# Patient Record
Sex: MALE | Race: BLACK OR AFRICAN AMERICAN | NOT HISPANIC OR LATINO | URBAN - METROPOLITAN AREA
[De-identification: names, ages, dates, MRNs, and addresses within clinical notes are randomized per-mention and may not be internally consistent; named-entity substitution may affect disease eponyms.]

---

## 2020-09-08 ENCOUNTER — OFFICE VISIT (OUTPATIENT)
Dept: URBAN - METROPOLITAN AREA TELEHEALTH 2 | Facility: TELEHEALTH | Age: 75
End: 2020-09-08
Payer: MEDICARE

## 2020-09-08 DIAGNOSIS — K74.69 OTHER CIRRHOSIS OF LIVER: ICD-10-CM

## 2020-09-08 PROCEDURE — 99442 PHONE E/M BY PHYS 11-20 MIN: CPT | Performed by: INTERNAL MEDICINE

## 2020-09-08 RX ORDER — CARVEDILOL 25 MG/1
TAKE 1 TABLET (25 MG) BY ORAL ROUTE 2 TIMES PER DAY WITH FOOD TABLET, FILM COATED ORAL 2
Qty: 0 | Refills: 0 | Status: ACTIVE | COMMUNITY
Start: 1900-01-01

## 2020-09-08 RX ORDER — LEVOTHYROXINE SODIUM 0.05 MG/1
TAKE 1 TABLET (50 MCG) BY ORAL ROUTE ONCE DAILY TABLET ORAL 1
Qty: 0 | Refills: 0 | Status: ACTIVE | COMMUNITY
Start: 1900-01-01

## 2020-09-08 RX ORDER — TORSEMIDE 20 MG/1
TAKE 1 TABLET (20 MG) BY ORAL ROUTE ONCE DAILY TABLET ORAL 1
Qty: 0 | Refills: 0 | Status: ACTIVE | COMMUNITY
Start: 1900-01-01

## 2020-09-08 RX ORDER — CALCITRIOL 0.25 UG/1
TAKE 1 CAPSULE (0.25 MCG) BY ORAL ROUTE ONCE DAILY CAPSULE ORAL 1
Qty: 0 | Refills: 0 | Status: ACTIVE | COMMUNITY
Start: 1900-01-01

## 2020-09-08 RX ORDER — APIXABAN 2.5 MG/1
TAKE 1 TABLET (2.5 MG) BY ORAL ROUTE 2 TIMES PER DAY TABLET, FILM COATED ORAL 2
Qty: 0 | Refills: 0 | Status: ACTIVE | COMMUNITY
Start: 1900-01-01

## 2020-09-08 RX ORDER — HYDRALAZINE HYDROCHLORIDE 50 MG/1
TAKE 1 TABLET (50 MG) BY ORAL ROUTE 2 TIMES PER DAY WITH FOOD TABLET ORAL 2
Qty: 0 | Refills: 0 | Status: ACTIVE | COMMUNITY
Start: 1900-01-01

## 2020-09-08 NOTE — HPI-TODAY'S VISIT:
The patient is following up for cirrhosis.  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  Currently taking diuretics daily that's being managed by cardiology.   Doing well now with fluids.  Scheduled for what seems like a RHC by cardiologist.

## 2020-09-18 ENCOUNTER — TELEPHONE ENCOUNTER (OUTPATIENT)
Dept: URBAN - METROPOLITAN AREA CLINIC 115 | Facility: CLINIC | Age: 75
End: 2020-09-18

## 2021-02-26 ENCOUNTER — OFFICE VISIT (OUTPATIENT)
Dept: URBAN - METROPOLITAN AREA CLINIC 42 | Facility: CLINIC | Age: 76
End: 2021-02-26
Payer: MEDICARE

## 2021-02-26 DIAGNOSIS — K74.69 OTHER CIRRHOSIS OF LIVER: ICD-10-CM

## 2021-02-26 PROCEDURE — 99213 OFFICE O/P EST LOW 20 MIN: CPT | Performed by: INTERNAL MEDICINE

## 2021-02-26 RX ORDER — APIXABAN 2.5 MG/1
TAKE 1 TABLET (2.5 MG) BY ORAL ROUTE 2 TIMES PER DAY TABLET, FILM COATED ORAL 2
Qty: 0 | Refills: 0 | Status: ACTIVE | COMMUNITY
Start: 1900-01-01

## 2021-02-26 RX ORDER — CARVEDILOL 25 MG/1
TAKE 1 TABLET (25 MG) BY ORAL ROUTE 2 TIMES PER DAY WITH FOOD TABLET, FILM COATED ORAL 2
Qty: 0 | Refills: 0 | Status: ACTIVE | COMMUNITY
Start: 1900-01-01

## 2021-02-26 RX ORDER — HYDRALAZINE HYDROCHLORIDE 50 MG/1
TAKE 1 TABLET (50 MG) BY ORAL ROUTE 2 TIMES PER DAY WITH FOOD TABLET ORAL 2
Qty: 0 | Refills: 0 | Status: ACTIVE | COMMUNITY
Start: 1900-01-01

## 2021-02-26 RX ORDER — CALCITRIOL 0.25 UG/1
TAKE 1 CAPSULE (0.25 MCG) BY ORAL ROUTE ONCE DAILY CAPSULE ORAL 1
Qty: 0 | Refills: 0 | Status: ACTIVE | COMMUNITY
Start: 1900-01-01

## 2021-02-26 RX ORDER — LEVOTHYROXINE SODIUM 0.05 MG/1
TAKE 1 TABLET (50 MCG) BY ORAL ROUTE ONCE DAILY TABLET ORAL 1
Qty: 0 | Refills: 0 | Status: ACTIVE | COMMUNITY
Start: 1900-01-01

## 2021-02-26 RX ORDER — TORSEMIDE 20 MG/1
TAKE 1 TABLET (20 MG) BY ORAL ROUTE ONCE DAILY TABLET ORAL 1
Qty: 0 | Refills: 0 | Status: ACTIVE | COMMUNITY
Start: 1900-01-01

## 2021-02-26 NOTE — PHYSICAL EXAM EYES:
Conjuntivae and eyelids appear normal, Sclerae : White without injection , Conjuntivae and eyelids appear normal,  Sclerae : White without injection

## 2021-02-26 NOTE — HPI-TODAY'S VISIT:
The patient is following up today for cirrhosis.  Since the last visit, doing well.  No real complaints today.  Seeing cardiology and being managed with furosemide 40 mg qday.  Still drink 1 drink every other week.  No GI bleeding.  Normal BMs.

## 2021-02-26 NOTE — PHYSICAL EXAM HENT:
Head, normocephalic, atraumatic, Face, Face within normal limits, Ears, External ears within normal limits, Nose/Nasopharynx, External nose  normal appearance, nares patent, no nasal discharge, Mouth and Throat, Oral cavity appearance normal, Breath odor normal, Lips, Appearance normal , Head,  normocephalic,  atraumatic,  Face,  Face within normal limits,  Ears,  External ears within normal limits,  Nose/Nasopharynx,  External nose  normal appearance,  nares patent,  no nasal discharge,  Mouth and Throat,  Oral cavity appearance normal,  Breath odor normal,  Lips,  Appearance normal

## 2021-02-26 NOTE — PHYSICAL EXAM GASTROINTESTINAL
Abdomen , soft, but slightly distended , no guarding or rigidity , no masses palpable , normal bowel sounds , Liver and Spleen , no hepatomegaly present , no hepatosplenomegaly , liver nontender , spleen not palpable , Abdomen , soft, nontender, nondistended , no guarding or rigidity , no masses palpable , normal bowel sounds , Liver and Spleen , no hepatomegaly present , no hepatosplenomegaly , liver nontender , spleen not palpable

## 2021-02-26 NOTE — PHYSICAL EXAM CARDIOVASCULAR:
no edema, no murmurs, regular rate and rhythm , no edema,  no murmurs,  regular rate and rhythm , no edema.

## 2021-09-03 ENCOUNTER — OFFICE VISIT (OUTPATIENT)
Dept: URBAN - METROPOLITAN AREA CLINIC 42 | Facility: CLINIC | Age: 76
End: 2021-09-03
Payer: MEDICARE

## 2021-09-03 VITALS
WEIGHT: 197 LBS | TEMPERATURE: 97.3 F | SYSTOLIC BLOOD PRESSURE: 105 MMHG | HEART RATE: 44 BPM | RESPIRATION RATE: 14 BRPM | DIASTOLIC BLOOD PRESSURE: 61 MMHG | HEIGHT: 72 IN | BODY MASS INDEX: 26.68 KG/M2

## 2021-09-03 DIAGNOSIS — K74.69 OTHER CIRRHOSIS OF LIVER: ICD-10-CM

## 2021-09-03 PROCEDURE — 99213 OFFICE O/P EST LOW 20 MIN: CPT | Performed by: INTERNAL MEDICINE

## 2021-09-03 RX ORDER — TORSEMIDE 20 MG/1
TAKE 1 TABLET (20 MG) BY ORAL ROUTE ONCE DAILY TABLET ORAL 1
Qty: 0 | Refills: 0 | Status: ACTIVE | COMMUNITY
Start: 1900-01-01

## 2021-09-03 RX ORDER — CALCITRIOL 0.25 UG/1
TAKE 1 CAPSULE (0.25 MCG) BY ORAL ROUTE ONCE DAILY CAPSULE ORAL 1
Qty: 0 | Refills: 0 | Status: ACTIVE | COMMUNITY
Start: 1900-01-01

## 2021-09-03 RX ORDER — APIXABAN 2.5 MG/1
TAKE 1 TABLET (2.5 MG) BY ORAL ROUTE 2 TIMES PER DAY TABLET, FILM COATED ORAL 2
Qty: 0 | Refills: 0 | Status: ACTIVE | COMMUNITY
Start: 1900-01-01

## 2021-09-03 RX ORDER — CARVEDILOL 25 MG/1
TAKE 1 TABLET (25 MG) BY ORAL ROUTE 2 TIMES PER DAY WITH FOOD TABLET, FILM COATED ORAL 2
Qty: 0 | Refills: 0 | Status: ACTIVE | COMMUNITY
Start: 1900-01-01

## 2021-09-03 RX ORDER — LEVOTHYROXINE SODIUM 0.05 MG/1
TAKE 1 TABLET (50 MCG) BY ORAL ROUTE ONCE DAILY TABLET ORAL 1
Qty: 0 | Refills: 0 | Status: ACTIVE | COMMUNITY
Start: 1900-01-01

## 2021-09-03 RX ORDER — HYDRALAZINE HYDROCHLORIDE 50 MG/1
TAKE 1 TABLET (50 MG) BY ORAL ROUTE 2 TIMES PER DAY WITH FOOD TABLET ORAL 2
Qty: 0 | Refills: 0 | Status: ACTIVE | COMMUNITY
Start: 1900-01-01

## 2021-09-03 NOTE — PHYSICAL EXAM CARDIOVASCULAR:
findings of chronic LE edema with discoloration and thickened skin,  no murmurs,  regular rate and rhythm

## 2021-09-03 NOTE — PHYSICAL EXAM CHEST:
no lesions,  no deformities,  no traumatic injuries,  no significant scars are present,  chest wall non-tender,  no masses present, breathing is unlabored without accessory muscle use,normal breath sounds
Attending Only

## 2021-09-03 NOTE — PHYSICAL EXAM GASTROINTESTINAL
Abdomen , soft, nontender, mildly distended , no guarding or rigidity , no masses palpable , normal bowel sounds , Liver and Spleen , no hepatomegaly present , no hepatosplenomegaly , liver nontender , spleen not palpable

## 2022-02-04 ENCOUNTER — TELEPHONE ENCOUNTER (OUTPATIENT)
Dept: URBAN - METROPOLITAN AREA CLINIC 6 | Facility: CLINIC | Age: 77
End: 2022-02-04

## 2022-08-08 ENCOUNTER — LAB OUTSIDE AN ENCOUNTER (OUTPATIENT)
Dept: URBAN - METROPOLITAN AREA CLINIC 42 | Facility: CLINIC | Age: 77
End: 2022-08-08

## 2022-08-23 ENCOUNTER — TELEPHONE ENCOUNTER (OUTPATIENT)
Dept: URBAN - METROPOLITAN AREA CLINIC 92 | Facility: CLINIC | Age: 77
End: 2022-08-23

## 2022-08-26 ENCOUNTER — WEB ENCOUNTER (OUTPATIENT)
Dept: URBAN - METROPOLITAN AREA CLINIC 42 | Facility: CLINIC | Age: 77
End: 2022-08-26

## 2022-08-26 ENCOUNTER — OFFICE VISIT (OUTPATIENT)
Dept: URBAN - METROPOLITAN AREA CLINIC 42 | Facility: CLINIC | Age: 77
End: 2022-08-26
Payer: MEDICARE

## 2022-08-26 VITALS
DIASTOLIC BLOOD PRESSURE: 78 MMHG | HEIGHT: 72 IN | WEIGHT: 210 LBS | TEMPERATURE: 97.1 F | BODY MASS INDEX: 28.44 KG/M2 | RESPIRATION RATE: 20 BRPM | SYSTOLIC BLOOD PRESSURE: 142 MMHG | HEART RATE: 67 BPM

## 2022-08-26 DIAGNOSIS — K74.69 OTHER CIRRHOSIS OF LIVER: ICD-10-CM

## 2022-08-26 PROCEDURE — 99213 OFFICE O/P EST LOW 20 MIN: CPT | Performed by: INTERNAL MEDICINE

## 2022-08-26 RX ORDER — CARVEDILOL 25 MG/1
TAKE 1 TABLET (25 MG) BY ORAL ROUTE 2 TIMES PER DAY WITH FOOD TABLET, FILM COATED ORAL 2
Qty: 0 | Refills: 0 | Status: ACTIVE | COMMUNITY
Start: 1900-01-01

## 2022-08-26 RX ORDER — TORSEMIDE 20 MG/1
TAKE 1 TABLET (20 MG) BY ORAL ROUTE ONCE DAILY TABLET ORAL 1
Qty: 0 | Refills: 0 | Status: ACTIVE | COMMUNITY
Start: 1900-01-01

## 2022-08-26 RX ORDER — LACTULOSE 10 G/15ML
15 ML AS NEEDED SOLUTION ORAL
Qty: 900 | Refills: 6 | OUTPATIENT
Start: 2022-08-26 | End: 2023-03-24

## 2022-08-26 RX ORDER — APIXABAN 2.5 MG/1
TAKE 1 TABLET (2.5 MG) BY ORAL ROUTE 2 TIMES PER DAY TABLET, FILM COATED ORAL 2
Qty: 0 | Refills: 0 | Status: ACTIVE | COMMUNITY
Start: 1900-01-01

## 2022-08-26 RX ORDER — LEVOTHYROXINE SODIUM 0.05 MG/1
TAKE 1 TABLET (50 MCG) BY ORAL ROUTE ONCE DAILY TABLET ORAL 1
Qty: 0 | Refills: 0 | Status: ACTIVE | COMMUNITY
Start: 1900-01-01

## 2022-08-26 RX ORDER — HYDRALAZINE HYDROCHLORIDE 50 MG/1
TAKE 1 TABLET (50 MG) BY ORAL ROUTE 2 TIMES PER DAY WITH FOOD TABLET ORAL 2
Qty: 0 | Refills: 0 | Status: ACTIVE | COMMUNITY
Start: 1900-01-01

## 2022-08-26 RX ORDER — CALCITRIOL 0.25 UG/1
TAKE 1 CAPSULE (0.25 MCG) BY ORAL ROUTE ONCE DAILY CAPSULE ORAL 1
Qty: 0 | Refills: 0 | Status: ACTIVE | COMMUNITY
Start: 1900-01-01

## 2022-08-26 NOTE — HPI-TODAY'S VISIT:
The patient is following up today for cirrhosis.  Since the last visit, doing well.  No real complaints today.  Seeing cardiology and being managed with diuretics.  Still drink 1 drink every other week.  No GI bleeding.  complaining of constipation and having BM every 2-3 days despite miralax.

## 2023-02-24 ENCOUNTER — TELEPHONE ENCOUNTER (OUTPATIENT)
Dept: URBAN - METROPOLITAN AREA CLINIC 42 | Facility: CLINIC | Age: 78
End: 2023-02-24

## 2023-02-24 ENCOUNTER — OFFICE VISIT (OUTPATIENT)
Dept: URBAN - METROPOLITAN AREA CLINIC 42 | Facility: CLINIC | Age: 78
End: 2023-02-24
Payer: MEDICARE

## 2023-02-24 ENCOUNTER — DASHBOARD ENCOUNTERS (OUTPATIENT)
Age: 78
End: 2023-02-24

## 2023-02-24 VITALS
HEART RATE: 74 BPM | RESPIRATION RATE: 20 BRPM | DIASTOLIC BLOOD PRESSURE: 76 MMHG | HEIGHT: 72 IN | WEIGHT: 192 LBS | TEMPERATURE: 97.1 F | BODY MASS INDEX: 26.01 KG/M2 | SYSTOLIC BLOOD PRESSURE: 123 MMHG

## 2023-02-24 DIAGNOSIS — K74.69 OTHER CIRRHOSIS OF LIVER: ICD-10-CM

## 2023-02-24 DIAGNOSIS — Z86.010 HISTORY OF COLON POLYPS: ICD-10-CM

## 2023-02-24 PROCEDURE — 99214 OFFICE O/P EST MOD 30 MIN: CPT | Performed by: INTERNAL MEDICINE

## 2023-02-24 RX ORDER — LACTULOSE 10 G/15ML
15 ML AS NEEDED SOLUTION ORAL
Qty: 900 | Refills: 6 | Status: ACTIVE | COMMUNITY
Start: 2022-08-26 | End: 2023-03-24

## 2023-02-24 RX ORDER — TORSEMIDE 20 MG/1
TAKE 1 TABLET (20 MG) BY ORAL ROUTE ONCE DAILY TABLET ORAL 1
Qty: 0 | Refills: 0 | Status: ACTIVE | COMMUNITY
Start: 1900-01-01

## 2023-02-24 RX ORDER — CARVEDILOL 25 MG/1
TAKE 1 TABLET (25 MG) BY ORAL ROUTE 2 TIMES PER DAY WITH FOOD TABLET, FILM COATED ORAL 2
Qty: 0 | Refills: 0 | Status: ACTIVE | COMMUNITY
Start: 1900-01-01

## 2023-02-24 RX ORDER — CALCITRIOL 0.25 UG/1
TAKE 1 CAPSULE (0.25 MCG) BY ORAL ROUTE ONCE DAILY CAPSULE ORAL 1
Qty: 0 | Refills: 0 | Status: ACTIVE | COMMUNITY
Start: 1900-01-01

## 2023-02-24 RX ORDER — APIXABAN 2.5 MG/1
TAKE 1 TABLET (2.5 MG) BY ORAL ROUTE 2 TIMES PER DAY TABLET, FILM COATED ORAL 2
Qty: 0 | Refills: 0 | Status: ACTIVE | COMMUNITY
Start: 1900-01-01

## 2023-02-24 RX ORDER — LEVOTHYROXINE SODIUM 0.05 MG/1
TAKE 1 TABLET (50 MCG) BY ORAL ROUTE ONCE DAILY TABLET ORAL 1
Qty: 0 | Refills: 0 | Status: ACTIVE | COMMUNITY
Start: 1900-01-01

## 2023-02-24 RX ORDER — HYDRALAZINE HYDROCHLORIDE 50 MG/1
TAKE 1 TABLET (50 MG) BY ORAL ROUTE 2 TIMES PER DAY WITH FOOD TABLET ORAL 2
Qty: 0 | Refills: 0 | Status: ACTIVE | COMMUNITY
Start: 1900-01-01

## 2023-02-24 NOTE — HPI-TODAY'S VISIT:
The patient is following up today for cirrhosis.  Since the last visit, doing well.  No real complaints today.  Seeing cardiology and being managed with diuretics.  Still drink 1 drink every other week.  No GI bleeding.

## 2023-02-28 PROBLEM — 428283002: Status: ACTIVE | Noted: 2023-02-24

## 2023-02-28 PROBLEM — 19943007: Status: ACTIVE | Noted: 2020-09-08

## 2023-03-13 NOTE — PROGRESS NOTES
Subjective   Patient ID:   Daryl Gramajo is a 77 y.o. male who presents for Edema (Bilateral ankles, +1 pitting edema right ankle.) and Dysphagia (Coughing when eating).  HPI  DVT:  Occurred in Oct 2022.  Right leg.  Taking Xarelto - has not been on this in months.  States he ran out and never got it filled.  Not having any chest pains, SOB, or leg pains.  This was his first DVT.  Xarelto had previously helped with his leg swelling.    RA:  Seeing rheumatology.  Taking folic acid, methotrexate, sulfasalazine.    HTN:  BP was high last visit.  Taking Chlorthalidone.  BP today is 190/75.  Not checking at home.  Still getting some leg swelling, but this has improved some.  Denies dizziness, headaches.    Bradycardia:  HR is very low today in the 30s.  He normally runs in the 80s-90s.  Denies any dizziness, lightheadedness.  EKG today shows a 2nd degree AV block.  No previous EKG for comparison.    Edema:  Getting knee, wrist, foot edema.  Secondary to RA.  This is stable.    Health maintenance:  Smoking: Never a smoker.   PSA (50+): Aug 2022  Labs: May 2022.   Prevnar 13 (65+):  Pneumovax 23 (65+, 1 year after Prev 13):  Influenza:  Zostavax (60+, 1 time, at pharmacy):     Review of Systems  12 point review of systems negative unless stated above in HPI    Vitals:    03/20/23 1441   BP: (!) 208/92   Pulse:        Physical Exam  General: Alert and oriented, well nourished, no acute distress.  Lungs: Clear to auscultation, non-labored respiration.  Heart: Normal rate, regular rhythm, no murmur, gallop or edema.  Neurologic: Awake, alert, and oriented X3, CN II-XII intact.  Psychiatric: Cooperative, appropriate mood and affect.    Assessment/Plan   We discussed your EKG results, heart rate, and blood pressure in detail today.  I have placed a referral to cardiology for further management.   As we discussed, if you experience dizziness, light headedness, headache, shortness of breath, palpitations, or chest pains - I  would like you to call 911 immediately.  Pt and wife voiced understanding.  Since you do not currently have any symptoms, I would like you to follow up with cardiology ASAP.  I have refilled your Xarelto for now.  If symptoms persist or worsen despite current plan of care, please contact your healthcare provider for further evaluation.  Patient instructed to contact the office if there are any questions regarding their care or treatment.   [facility] [phone].  [facility] [phone].    Fu 1 month or sooner.  Diagnoses and all orders for this visit:  Rheumatoid arthritis involving multiple sites, unspecified whether rheumatoid factor present (CMS/HCC)  Chronic deep vein thrombosis (DVT) of distal vein of right lower extremity (CMS/HCC)  -     rivaroxaban (Xarelto) 20 mg tablet; Take 1 tablet (20 mg) by mouth once daily in the evening. Take with meals. Take with food.  Edema, peripheral  Benign hypertension  -     Referral to Cardiology; Future  Bradycardia  -     ECG 12 lead  -     Referral to Cardiology; Future  AV block  -     Referral to Cardiology; Future

## 2023-03-16 PROBLEM — M06.9 RHEUMATOID ARTHRITIS (MULTI): Status: ACTIVE | Noted: 2023-03-16

## 2023-03-16 PROBLEM — R60.0 EDEMA, PERIPHERAL: Status: ACTIVE | Noted: 2023-03-16

## 2023-03-16 PROBLEM — I82.409 DVT (DEEP VENOUS THROMBOSIS) (MULTI): Status: ACTIVE | Noted: 2023-03-16

## 2023-03-16 PROBLEM — I10 BENIGN HYPERTENSION: Status: ACTIVE | Noted: 2023-03-16

## 2023-03-16 PROBLEM — R60.9 EDEMA, PERIPHERAL: Status: ACTIVE | Noted: 2023-03-16

## 2023-03-16 RX ORDER — FLUOROMETHOLONE 1 MG/ML
SUSPENSION/ DROPS OPHTHALMIC
COMMUNITY
Start: 2022-08-09

## 2023-03-16 RX ORDER — BRINZOLAMIDE/BRIMONIDINE TARTRATE 10; 2 MG/ML; MG/ML
SUSPENSION/ DROPS OPHTHALMIC
COMMUNITY
Start: 2022-08-09

## 2023-03-16 RX ORDER — CHLORTHALIDONE 50 MG/1
50 TABLET ORAL DAILY
COMMUNITY
Start: 2022-08-08 | End: 2023-10-23 | Stop reason: SDUPTHER

## 2023-03-16 RX ORDER — ETANERCEPT 50 MG/ML
50 SOLUTION SUBCUTANEOUS
COMMUNITY
End: 2023-12-28

## 2023-03-16 RX ORDER — MULTIVIT-MIN/IRON/FOLIC ACID/K 18-600-40
1 CAPSULE ORAL DAILY
COMMUNITY
Start: 2022-01-05

## 2023-03-16 RX ORDER — ERYTHROMYCIN 5 MG/G
OINTMENT OPHTHALMIC
COMMUNITY
Start: 2022-08-04

## 2023-03-16 RX ORDER — SULFASALAZINE 500 MG/1
500 TABLET ORAL EVERY 12 HOURS
COMMUNITY
Start: 2013-01-03

## 2023-03-16 RX ORDER — METHOTREXATE 2.5 MG/1
6 TABLET ORAL
COMMUNITY
Start: 2012-08-06 | End: 2023-12-12 | Stop reason: SDUPTHER

## 2023-03-16 RX ORDER — FOLIC ACID 1 MG/1
1 TABLET ORAL DAILY
COMMUNITY
Start: 2013-01-03

## 2023-03-20 ENCOUNTER — OFFICE VISIT (OUTPATIENT)
Dept: PRIMARY CARE | Facility: CLINIC | Age: 78
End: 2023-03-20
Payer: MEDICARE

## 2023-03-20 VITALS
SYSTOLIC BLOOD PRESSURE: 208 MMHG | WEIGHT: 170.4 LBS | DIASTOLIC BLOOD PRESSURE: 92 MMHG | HEART RATE: 37 BPM | BODY MASS INDEX: 22.58 KG/M2 | HEIGHT: 73 IN

## 2023-03-20 DIAGNOSIS — R00.1 BRADYCARDIA: ICD-10-CM

## 2023-03-20 DIAGNOSIS — R60.9 EDEMA, PERIPHERAL: ICD-10-CM

## 2023-03-20 DIAGNOSIS — I10 BENIGN HYPERTENSION: ICD-10-CM

## 2023-03-20 DIAGNOSIS — I82.5Z1 CHRONIC DEEP VEIN THROMBOSIS (DVT) OF DISTAL VEIN OF RIGHT LOWER EXTREMITY (MULTI): ICD-10-CM

## 2023-03-20 DIAGNOSIS — M06.9 RHEUMATOID ARTHRITIS INVOLVING MULTIPLE SITES, UNSPECIFIED WHETHER RHEUMATOID FACTOR PRESENT (MULTI): Primary | ICD-10-CM

## 2023-03-20 DIAGNOSIS — I44.30 AV BLOCK: ICD-10-CM

## 2023-03-20 PROCEDURE — 3080F DIAST BP >= 90 MM HG: CPT | Performed by: PHYSICIAN ASSISTANT

## 2023-03-20 PROCEDURE — 3077F SYST BP >= 140 MM HG: CPT | Performed by: PHYSICIAN ASSISTANT

## 2023-03-20 PROCEDURE — 93000 ELECTROCARDIOGRAM COMPLETE: CPT | Performed by: PHYSICIAN ASSISTANT

## 2023-03-20 PROCEDURE — 1159F MED LIST DOCD IN RCRD: CPT | Performed by: PHYSICIAN ASSISTANT

## 2023-03-20 PROCEDURE — 99214 OFFICE O/P EST MOD 30 MIN: CPT | Performed by: PHYSICIAN ASSISTANT

## 2023-03-20 PROCEDURE — 1036F TOBACCO NON-USER: CPT | Performed by: PHYSICIAN ASSISTANT

## 2023-03-20 PROCEDURE — 1160F RVW MEDS BY RX/DR IN RCRD: CPT | Performed by: PHYSICIAN ASSISTANT

## 2023-03-20 RX ORDER — ACETAMINOPHEN 500 MG
5000 TABLET ORAL DAILY
COMMUNITY

## 2023-03-22 ENCOUNTER — OFFICE VISIT (OUTPATIENT)
Dept: URBAN - METROPOLITAN AREA SURGERY CENTER 13 | Facility: SURGERY CENTER | Age: 78
End: 2023-03-22

## 2023-04-14 LAB
ALANINE AMINOTRANSFERASE (SGPT) (U/L) IN SER/PLAS: 8 U/L (ref 10–52)
ALBUMIN (G/DL) IN SER/PLAS: 3.7 G/DL (ref 3.4–5)
ALKALINE PHOSPHATASE (U/L) IN SER/PLAS: 74 U/L (ref 33–136)
ANION GAP IN SER/PLAS: 10 MMOL/L (ref 10–20)
ASPARTATE AMINOTRANSFERASE (SGOT) (U/L) IN SER/PLAS: 17 U/L (ref 9–39)
BILIRUBIN TOTAL (MG/DL) IN SER/PLAS: 0.5 MG/DL (ref 0–1.2)
C REACTIVE PROTEIN (MG/L) IN SER/PLAS: 1.18 MG/DL
CALCIUM (MG/DL) IN SER/PLAS: 8.5 MG/DL (ref 8.6–10.3)
CARBON DIOXIDE, TOTAL (MMOL/L) IN SER/PLAS: 25 MMOL/L (ref 21–32)
CHLORIDE (MMOL/L) IN SER/PLAS: 106 MMOL/L (ref 98–107)
CREATININE (MG/DL) IN SER/PLAS: 0.68 MG/DL (ref 0.5–1.3)
ERYTHROCYTE DISTRIBUTION WIDTH (RATIO) BY AUTOMATED COUNT: 16.3 % (ref 11.5–14.5)
ERYTHROCYTE MEAN CORPUSCULAR HEMOGLOBIN CONCENTRATION (G/DL) BY AUTOMATED: 32.5 G/DL (ref 32–36)
ERYTHROCYTE MEAN CORPUSCULAR VOLUME (FL) BY AUTOMATED COUNT: 89 FL (ref 80–100)
ERYTHROCYTES (10*6/UL) IN BLOOD BY AUTOMATED COUNT: 4.25 X10E12/L (ref 4.5–5.9)
GFR MALE: >90 ML/MIN/1.73M2
GLUCOSE (MG/DL) IN SER/PLAS: 83 MG/DL (ref 74–99)
HEMATOCRIT (%) IN BLOOD BY AUTOMATED COUNT: 37.9 % (ref 41–52)
HEMOGLOBIN (G/DL) IN BLOOD: 12.3 G/DL (ref 13.5–17.5)
LEUKOCYTES (10*3/UL) IN BLOOD BY AUTOMATED COUNT: 4.7 X10E9/L (ref 4.4–11.3)
PLATELETS (10*3/UL) IN BLOOD AUTOMATED COUNT: 204 X10E9/L (ref 150–450)
POTASSIUM (MMOL/L) IN SER/PLAS: 4.1 MMOL/L (ref 3.5–5.3)
PROTEIN TOTAL: 7.3 G/DL (ref 6.4–8.2)
SEDIMENTATION RATE, ERYTHROCYTE: 87 MM/H (ref 0–20)
SODIUM (MMOL/L) IN SER/PLAS: 137 MMOL/L (ref 136–145)
UREA NITROGEN (MG/DL) IN SER/PLAS: 17 MG/DL (ref 6–23)

## 2023-05-10 DIAGNOSIS — I82.5Z1 CHRONIC DEEP VEIN THROMBOSIS (DVT) OF DISTAL VEIN OF RIGHT LOWER EXTREMITY (MULTI): ICD-10-CM

## 2023-06-20 DIAGNOSIS — I82.5Z1 CHRONIC DEEP VEIN THROMBOSIS (DVT) OF DISTAL VEIN OF RIGHT LOWER EXTREMITY (MULTI): ICD-10-CM

## 2023-06-20 RX ORDER — RIVAROXABAN 20 MG/1
TABLET, FILM COATED ORAL
Qty: 60 TABLET | Refills: 5 | Status: SHIPPED | OUTPATIENT
Start: 2023-06-20 | End: 2024-05-30 | Stop reason: WASHOUT

## 2023-07-11 ENCOUNTER — HOSPITAL ENCOUNTER (OUTPATIENT)
Dept: DATA CONVERSION | Facility: HOSPITAL | Age: 78
End: 2023-07-11
Attending: SURGERY | Admitting: SURGERY

## 2023-07-11 DIAGNOSIS — R59.0 LOCALIZED ENLARGED LYMPH NODES: ICD-10-CM

## 2023-07-17 LAB
ATRIAL RATE: 56 BPM
P AXIS: 62 DEGREES
Q ONSET: 220 MS
QRS COUNT: 7 BEATS
QRS DURATION: 96 MS
QT INTERVAL: 488 MS
QTC CALCULATION(BAZETT): 387 MS
QTC FREDERICIA: 418 MS
R AXIS: 27 DEGREES
T AXIS: 36 DEGREES
T OFFSET: 464 MS
VENTRICULAR RATE: 38 BPM

## 2023-08-18 LAB
ALANINE AMINOTRANSFERASE (SGPT) (U/L) IN SER/PLAS: 8 U/L (ref 10–52)
ALBUMIN (G/DL) IN SER/PLAS: 3.8 G/DL (ref 3.4–5)
ALKALINE PHOSPHATASE (U/L) IN SER/PLAS: 65 U/L (ref 33–136)
ANION GAP IN SER/PLAS: 10 MMOL/L (ref 10–20)
ASPARTATE AMINOTRANSFERASE (SGOT) (U/L) IN SER/PLAS: 20 U/L (ref 9–39)
BILIRUBIN TOTAL (MG/DL) IN SER/PLAS: 0.7 MG/DL (ref 0–1.2)
C REACTIVE PROTEIN (MG/L) IN SER/PLAS: 0.98 MG/DL
CALCIUM (MG/DL) IN SER/PLAS: 9.4 MG/DL (ref 8.6–10.3)
CARBON DIOXIDE, TOTAL (MMOL/L) IN SER/PLAS: 29 MMOL/L (ref 21–32)
CHLORIDE (MMOL/L) IN SER/PLAS: 103 MMOL/L (ref 98–107)
CREATININE (MG/DL) IN SER/PLAS: 0.74 MG/DL (ref 0.5–1.3)
ERYTHROCYTE DISTRIBUTION WIDTH (RATIO) BY AUTOMATED COUNT: 15.9 % (ref 11.5–14.5)
ERYTHROCYTE MEAN CORPUSCULAR HEMOGLOBIN CONCENTRATION (G/DL) BY AUTOMATED: 31.8 G/DL (ref 32–36)
ERYTHROCYTE MEAN CORPUSCULAR VOLUME (FL) BY AUTOMATED COUNT: 91 FL (ref 80–100)
ERYTHROCYTES (10*6/UL) IN BLOOD BY AUTOMATED COUNT: 4.31 X10E12/L (ref 4.5–5.9)
GFR MALE: >90 ML/MIN/1.73M2
GLUCOSE (MG/DL) IN SER/PLAS: 79 MG/DL (ref 74–99)
HEMATOCRIT (%) IN BLOOD BY AUTOMATED COUNT: 39.3 % (ref 41–52)
HEMOGLOBIN (G/DL) IN BLOOD: 12.5 G/DL (ref 13.5–17.5)
LEUKOCYTES (10*3/UL) IN BLOOD BY AUTOMATED COUNT: 5.1 X10E9/L (ref 4.4–11.3)
PLATELETS (10*3/UL) IN BLOOD AUTOMATED COUNT: 205 X10E9/L (ref 150–450)
POTASSIUM (MMOL/L) IN SER/PLAS: 4 MMOL/L (ref 3.5–5.3)
PROTEIN TOTAL: 7.8 G/DL (ref 6.4–8.2)
SEDIMENTATION RATE, ERYTHROCYTE: 64 MM/H (ref 0–20)
SODIUM (MMOL/L) IN SER/PLAS: 138 MMOL/L (ref 136–145)
UREA NITROGEN (MG/DL) IN SER/PLAS: 15 MG/DL (ref 6–23)

## 2023-08-25 ENCOUNTER — HOSPITAL ENCOUNTER (OUTPATIENT)
Dept: DATA CONVERSION | Facility: HOSPITAL | Age: 78
End: 2023-08-26
Attending: INTERNAL MEDICINE | Admitting: INTERNAL MEDICINE
Payer: MEDICARE

## 2023-08-25 DIAGNOSIS — I45.5 OTHER SPECIFIED HEART BLOCK: ICD-10-CM

## 2023-08-25 DIAGNOSIS — I44.1 ATRIOVENTRICULAR BLOCK, SECOND DEGREE: ICD-10-CM

## 2023-08-25 DIAGNOSIS — I44.30 UNSPECIFIED ATRIOVENTRICULAR BLOCK: ICD-10-CM

## 2023-08-27 LAB
ATRIAL RATE: 61 BPM
P AXIS: -27 DEGREES
P OFFSET: 170 MS
P ONSET: 133 MS
PR INTERVAL: 112 MS
Q ONSET: 189 MS
QRS COUNT: 10 BEATS
QRS DURATION: 176 MS
QT INTERVAL: 500 MS
QTC CALCULATION(BAZETT): 503 MS
QTC FREDERICIA: 502 MS
R AXIS: -63 DEGREES
T AXIS: 84 DEGREES
T OFFSET: 439 MS
VENTRICULAR RATE: 61 BPM

## 2023-08-28 LAB
ATRIAL RATE: 60 BPM
ATRIAL RATE: 67 BPM
P AXIS: -27 DEGREES
P AXIS: 57 DEGREES
P OFFSET: 148 MS
P OFFSET: 177 MS
P ONSET: 140 MS
P ONSET: 97 MS
PR INTERVAL: 116 MS
PR INTERVAL: 182 MS
Q ONSET: 188 MS
Q ONSET: 198 MS
QRS COUNT: 10 BEATS
QRS COUNT: 11 BEATS
QRS DURATION: 172 MS
QRS DURATION: 180 MS
QT INTERVAL: 498 MS
QT INTERVAL: 508 MS
QTC CALCULATION(BAZETT): 508 MS
QTC CALCULATION(BAZETT): 526 MS
QTC FREDERICIA: 508 MS
QTC FREDERICIA: 517 MS
R AXIS: -61 DEGREES
R AXIS: -75 DEGREES
T AXIS: 81 DEGREES
T AXIS: 82 DEGREES
T OFFSET: 437 MS
T OFFSET: 452 MS
VENTRICULAR RATE: 60 BPM
VENTRICULAR RATE: 67 BPM

## 2023-09-29 VITALS — WEIGHT: 154.54 LBS | BODY MASS INDEX: 20.93 KG/M2 | HEIGHT: 72 IN

## 2023-09-30 NOTE — PROGRESS NOTES
Service: Electrophysiology     Subjective Data:   ORTIZ ELLINGTON is a 78 year old Male who is Hospital Day # 2.    Overnight Events: Patient had an uneventful night.     Objective Data:     Objective Information:      T   P  R  BP   MAP  SpO2   Value  36.5  68  18  168/89   127  100%  Date/Time 8/26 8:00 8/26 8:00 8/26 8:00 8/26 8:00  8/25 21:02 8/26 8:00  Range  (36.4C - 37.1C )  (60 - 75 )  (17 - 18 )  (116 - 188 )/ (68 - 103 )  (127 - 127 )  (99% - 100% )  Highest temp of 37.1 C was recorded at 8/25 22:45      Pain reported at 8/25 20:00: 0 = None    Physical Exam by System:    Constitutional: Well developed, awake/alert/oriented  x3, no distress, alert and cooperative   Eyes: PERRL, EOMI, clear sclera   ENMT: mucous membranes moist, no apparent injury,  no lesions seen   Head/Neck: Neck supple, no apparent injury, thyroid  without mass or tenderness, No JVD, trachea midline, no bruits   Respiratory/Thorax: Patent airways, CTAB, normal  breath sounds with good chest expansion, thorax symmetric   Cardiovascular: Regular, rate and rhythm, no murmurs,  2+ equal pulses of the extremities, normal S 1and S 2. Device site mildly ttp. Dressing c/d/i.   Gastrointestinal: Nondistended, soft, non-tender,  no rebound tenderness or guarding, no masses palpable, no organomegaly, +BS, no bruits   Musculoskeletal: ROM intact, no joint swelling, normal  strength   Extremities: normal extremities, no cyanosis edema,  contusions or wounds, no clubbing   Psychological: Appropriate mood and behavior   Skin: Warm and dry, no lesions, no rashes     Assessment and Plan:        Additional Dx:   Complete heart block: Entered Date: 25-Aug-2023 13:24    Code Status:  ·  Code Status Full Code     Advance Care Planning:  Advance Care Planning: I evaluated the patient  and determined the patient's capacity to understand the risks, benefits and alternatives to treatment. I elicited the patient's goals for treatment and reviewed advance  directives and medical orders for life sustaining treatment. The patient was given  an opportunity to review a blank advance directive as appropriate.     Assessment:    Mr. Gramajo is a 78M with pmhx of high grade AV block who presented for dual chamber pacemaker 8/25/23.     #high grade avb    -pacemaker interrogation this am shows device is functioning appropriately  -am cxr shows no ptx or acute abnormality  -telemetry unremarkable  -device site this am shows normal incision with site bandage clean/dry/intact   -follow up arranged per discharge profile  -continue other medications as rx'ed   -patient will be discharged this am    Attestation:   Note Completion:  I am a:  Resident/Fellow   Attending Attestation I saw and evaluated the patient.  I personally obtained the key and critical portions of the history and physical exam or was physically present for key and  critical portions performed by the resident/fellow. I reviewed the resident/fellow?s documentation and discussed the patient with the resident/fellow.  I agree with the resident/fellow?s medical decision making as documented in the note.   I personally evaluated the patient on 26-Aug-2023         Electronic Signatures:  Conner Frank)  (Signed 28-Aug-2023 10:53)   Co-Signer: Service, Assessment/Plan Review, Subjective  Data, Objective Data, Assessment and Plan, Note Completion  Angel Pham (Fellow))  (Signed 26-Aug-2023 09:27)   Authored: Service, Assessment/Plan Review, Subjective  Data, Objective Data, Assessment and Plan, Note Completion      Last Updated: 28-Aug-2023 10:53 by Conner Frank)

## 2023-09-30 NOTE — H&P
History of Present Illness:   History Present Illness:  Reason for surgery: High grade AV blcok   HPI:    78 year old with hx of DVT, RA and HTN , presented  with Mobitz II with V rate in the 30s , patient denies any syncope or presyncope at this time.     presented for dual chambers PPM implantation.        S/P PPM implant with no issues.       Allergies:        Allergies:  ·  NKDA :   ·  Peanuts : Unknown    Home Medication Review:   Home Medications Reviewed: yes     Impression/Procedure:   ·  Impression and Planned Procedure: PPM implantation       ERAS (Enhanced Recovery After Surgery):  ·  ERAS Patient: no       Physical Exam by System:    Respiratory/Thorax: Patent airways, CTAB, normal  breath sounds with good chest expansion, thorax symmetric   Cardiovascular: Regular, rate and rhythm, no murmurs,  2+ equal pulses of the extremities, normal S 1and S 2     Airway/Sedation Assessment:  ·  Emotional Status calm   ·  Neurologic alert & oriented x 3   ·  Respiratory clear to auscultation   ·  Cardiovascular rhythm & rate regular   ·  GI/ soft, nontender     · Pulses present: Pedal Left, Pedal Right, Radial Left, Radial Right     ·  Mouth Opening OK yes   ·  Neck Flexibility OK yes   ·  Loose Teeth no   ·  Oropharyngeal Classification Class II   ·  ASA PS Classification ASA III   ·  Sedation Plan moderate sedation     Consent:   COVID-19 Consent:  ·  COVID-19 Risk Consent Surgeon has reviewed key risks related to the risk of ryan COVID-19 and if they contract COVID-19 what the risks are.     Coronavirus Attestation of Need for Surgery:  ·  COVID-19 Surgery / Procedure Attestation: Select all criteria that apply Risk of metastasis or progression of staging if delayed     Attestation:   Note Completion:  I am a:  Resident/Fellow   Attending Attestation I saw and evaluated the patient.  I personally obtained the key and critical portions of the history and physical exam or was physically present for key  and  critical portions performed by the resident/fellow. I reviewed the resident/fellow?s documentation and discussed the patient with the resident/fellow.  I agree with the resident/fellow?s medical decision making as documented in the note.   I personally evaluated the patient on 25-Aug-2023         Electronic Signatures:  Conner Frank)  (Signed 28-Aug-2023 10:48)   Co-Signer: History of Present Illness, Allergies, Home  Medication Review, Impression/Procedure, ERAS, Physical Exam, Consent, Note Completion  Conner Davis (Resident))  (Signed 25-Aug-2023 10:19)   Authored: History of Present Illness, Allergies, Home  Medication Review, Impression/Procedure, ERAS, Physical Exam, Consent, Note Completion      Last Updated: 28-Aug-2023 10:48 by Conner Frank)

## 2023-10-06 ENCOUNTER — HOSPITAL ENCOUNTER (OUTPATIENT)
Dept: CARDIOLOGY | Facility: CLINIC | Age: 78
Discharge: HOME | End: 2023-10-06
Payer: MEDICARE

## 2023-10-06 DIAGNOSIS — I44.2 CHB (COMPLETE HEART BLOCK) (MULTI): ICD-10-CM

## 2023-10-06 PROCEDURE — 93290 INTERROG DEV EVAL ICPMS IP: CPT

## 2023-10-06 PROCEDURE — 93290 INTERROG DEV EVAL ICPMS IP: CPT | Performed by: NURSE PRACTITIONER

## 2023-10-06 PROCEDURE — 93280 PM DEVICE PROGR EVAL DUAL: CPT | Performed by: NURSE PRACTITIONER

## 2023-10-23 DIAGNOSIS — I10 BENIGN HYPERTENSION: ICD-10-CM

## 2023-10-23 RX ORDER — CHLORTHALIDONE 50 MG/1
50 TABLET ORAL DAILY
Qty: 30 TABLET | Refills: 0 | Status: SHIPPED | OUTPATIENT
Start: 2023-10-23 | End: 2023-11-09 | Stop reason: ENTERED-IN-ERROR

## 2023-11-09 ENCOUNTER — OFFICE VISIT (OUTPATIENT)
Dept: PRIMARY CARE | Facility: CLINIC | Age: 78
End: 2023-11-09
Payer: MEDICARE

## 2023-11-09 VITALS
SYSTOLIC BLOOD PRESSURE: 160 MMHG | WEIGHT: 179.4 LBS | HEART RATE: 87 BPM | OXYGEN SATURATION: 98 % | BODY MASS INDEX: 24.3 KG/M2 | RESPIRATION RATE: 16 BRPM | DIASTOLIC BLOOD PRESSURE: 86 MMHG | HEIGHT: 72 IN

## 2023-11-09 DIAGNOSIS — R00.1 BRADYCARDIA: ICD-10-CM

## 2023-11-09 DIAGNOSIS — I10 BENIGN HYPERTENSION: ICD-10-CM

## 2023-11-09 DIAGNOSIS — I82.5Z1 CHRONIC DEEP VEIN THROMBOSIS (DVT) OF DISTAL VEIN OF RIGHT LOWER EXTREMITY (MULTI): ICD-10-CM

## 2023-11-09 DIAGNOSIS — Z00.00 MEDICARE ANNUAL WELLNESS VISIT, SUBSEQUENT: Primary | ICD-10-CM

## 2023-11-09 DIAGNOSIS — M06.9 RHEUMATOID ARTHRITIS INVOLVING MULTIPLE SITES, UNSPECIFIED WHETHER RHEUMATOID FACTOR PRESENT (MULTI): ICD-10-CM

## 2023-11-09 PROBLEM — D64.9 ANEMIA: Status: ACTIVE | Noted: 2023-11-09

## 2023-11-09 PROCEDURE — G0439 PPPS, SUBSEQ VISIT: HCPCS | Performed by: FAMILY MEDICINE

## 2023-11-09 PROCEDURE — 1170F FXNL STATUS ASSESSED: CPT | Performed by: FAMILY MEDICINE

## 2023-11-09 PROCEDURE — 1160F RVW MEDS BY RX/DR IN RCRD: CPT | Performed by: FAMILY MEDICINE

## 2023-11-09 PROCEDURE — 1159F MED LIST DOCD IN RCRD: CPT | Performed by: FAMILY MEDICINE

## 2023-11-09 PROCEDURE — 3079F DIAST BP 80-89 MM HG: CPT | Performed by: FAMILY MEDICINE

## 2023-11-09 PROCEDURE — 3077F SYST BP >= 140 MM HG: CPT | Performed by: FAMILY MEDICINE

## 2023-11-09 PROCEDURE — 1036F TOBACCO NON-USER: CPT | Performed by: FAMILY MEDICINE

## 2023-11-09 RX ORDER — CHLORTHALIDONE 25 MG/1
25 TABLET ORAL DAILY
Qty: 90 TABLET | Refills: 1 | Status: SHIPPED | OUTPATIENT
Start: 2023-11-09 | End: 2024-05-30 | Stop reason: SDUPTHER

## 2023-11-09 RX ORDER — CHLORTHALIDONE 25 MG/1
25 TABLET ORAL DAILY
COMMUNITY
Start: 2023-05-23 | End: 2023-11-09 | Stop reason: SDUPTHER

## 2023-11-09 ASSESSMENT — PATIENT HEALTH QUESTIONNAIRE - PHQ9
2. FEELING DOWN, DEPRESSED OR HOPELESS: NOT AT ALL
1. LITTLE INTEREST OR PLEASURE IN DOING THINGS: NOT AT ALL
SUM OF ALL RESPONSES TO PHQ9 QUESTIONS 1 AND 2: 0

## 2023-11-09 ASSESSMENT — ACTIVITIES OF DAILY LIVING (ADL)
DOING_HOUSEWORK: INDEPENDENT
TAKING_MEDICATION: INDEPENDENT
DRESSING: INDEPENDENT
MANAGING_FINANCES: INDEPENDENT
BATHING: INDEPENDENT
GROCERY_SHOPPING: INDEPENDENT

## 2023-11-09 NOTE — PATIENT INSTRUCTIONS
Risk factors identified during visit:   None    Flu shot: Patient declined.  PPSV23: Patient declined.  PCV13: Patient declined.  PCV20: Patient declined.  Shingrix: Patient declined.  Colon cancer screening: Patient declined.  AAA screening: N/A.  HIV screening: N/A.  Hepatitis C screening: Up to date.  Prostate cancer screening: N/A.    Recommend living will, health care power of .    Fasting labs.  Refilled medication.  Follow up with specialists as directed.    F/U 2-3 weeks: BP check., Review lab results.

## 2023-11-09 NOTE — PROGRESS NOTES
Subjective   Reason for Visit: Daryl Gramajo is an 78 y.o. male here for a Medicare Wellness visit.     Initial visit.    Past Medical, Surgical, and Family History reviewed and updated in chart.    Reviewed all medications by prescribing practitioner or clinical pharmacist (such as prescriptions, OTCs, herbal therapies and supplements) and documented in the medical record.    HPI    Patient Self Assessment of Health Status  Patient Self Assessment: Fair    Nutrition and Exercise  Current Diet: Well Balanced Diet  Adequate Fluid Intake: Yes  Caffeine: No  Exercise Frequency: Infrequently    Functional Ability/Level of Safety  Cognitive Impairment Observed: No cognitive impairment observed  Cognitive Impairment Reported: No cognitive impairment reported by patient or family    Home Safety Risk Factors: None    H/O HTN.  Out of Chlorthalidone x 2 weeks.  Requests refill.  Had labs done by rheumatology 3 months ago.    Medicare Wellness Billing Compliance Satisfied    *This is a visual tool to show completion of required items on the day of the visit. Green checks will only appear on the date of visit.    Review all medications by prescribing practitioner or clinical pharmacist (such as prescriptions, OTCs, herbal therapies and supplements) documented in the medical record    Past Medical, Surgical, and Family History reviewed and updated in chart    Tobacco Use Reviewed    Alcohol Use Reviewed    Illicit Drug Use Reviewed    PHQ2/9    Falls in Last Year Reviewed    Home Safety Risk Factors Reviewed    Cognitive Impairment Reviewed    Patient Self Assessment and Health Status    Current Diet Reviewed    Exercise Frequency    ADL - Hearing Impairment    ADL - Bathing    ADL - Dressing    ADL - Walks in Home    IADL - Managing Finances    IADL - Grocery Shopping    IADL - Taking Medications    IADL - Doing Housework      Patient Care Team:  Miguel Sidhu MD as PCP - General (Family  Medicine)  Joe Pulido PA-C as PCP - [employer] PCP   Specialists: Cardiology, Ophthalmology, Rheumatology.    Review of Systems  No other complaints.     Objective   Vitals:  /86   Pulse 87   Resp 16   Ht 1.829 m (6')   Wt 81.4 kg (179 lb 6.4 oz)   SpO2 98%   BMI 24.33 kg/m²       Physical Exam  Constitutional:       General: He is not in acute distress.     Appearance: He is normal weight.   Musculoskeletal:      Comments: Ambulating w/o assistance   Neurological:      Mental Status: He is oriented to person, place, and time.   Psychiatric:         Mood and Affect: Mood normal.     Assessment/Plan   Diagnoses and all orders for this visit:  Medicare annual wellness visit, subsequent  Benign hypertension  -     chlorthalidone (Hygroton) 25 mg tablet; Take 1 tablet (25 mg) by mouth once daily.  -     Lipid Panel; Future  -     Urinalysis with Reflex Microscopic and Culture; Future  Chronic deep vein thrombosis (DVT) of distal vein of right lower extremity (CMS/HCC)        -     Tx by cardiology  Bradycardia        -     Tx by cardiology  Rheumatoid arthritis involving multiple sites, unspecified whether rheumatoid factor present (CMS/HCC)        -     Tx by rheumatology    Risk factors identified during visit:   None    Flu shot: Patient declined.  PPSV23: Patient declined.  PCV13: Patient declined.  PCV20: Patient declined.  Shingrix: Patient declined.  Colon cancer screening: Patient declined.  AAA screening: N/A.  HIV screening: N/A.  Hepatitis C screening: Up to date.  Prostate cancer screening: N/A.    Recommend living will, health care power of .    Fasting labs.  Refilled medication.  Follow up with specialists as directed.    F/U 2-3 weeks: BP check., Review lab results.

## 2023-11-10 ENCOUNTER — LAB (OUTPATIENT)
Dept: LAB | Facility: LAB | Age: 78
End: 2023-11-10
Payer: MEDICARE

## 2023-11-10 DIAGNOSIS — I10 BENIGN HYPERTENSION: ICD-10-CM

## 2023-11-10 LAB
APPEARANCE UR: CLEAR
BILIRUB UR STRIP.AUTO-MCNC: NEGATIVE MG/DL
CHOLEST SERPL-MCNC: 169 MG/DL (ref 0–199)
CHOLESTEROL/HDL RATIO: 3.2
COLOR UR: YELLOW
GLUCOSE UR STRIP.AUTO-MCNC: NEGATIVE MG/DL
HDLC SERPL-MCNC: 52.3 MG/DL
HOLD SPECIMEN: NORMAL
KETONES UR STRIP.AUTO-MCNC: NEGATIVE MG/DL
LDLC SERPL CALC-MCNC: 103 MG/DL
LEUKOCYTE ESTERASE UR QL STRIP.AUTO: NEGATIVE
NITRITE UR QL STRIP.AUTO: NEGATIVE
NON HDL CHOLESTEROL: 117 MG/DL (ref 0–149)
PH UR STRIP.AUTO: 7 [PH]
PROT UR STRIP.AUTO-MCNC: NEGATIVE MG/DL
RBC # UR STRIP.AUTO: NEGATIVE /UL
SP GR UR STRIP.AUTO: 1.01
TRIGL SERPL-MCNC: 67 MG/DL (ref 0–149)
UROBILINOGEN UR STRIP.AUTO-MCNC: <2 MG/DL
VLDL: 13 MG/DL (ref 0–40)

## 2023-11-10 PROCEDURE — 81003 URINALYSIS AUTO W/O SCOPE: CPT

## 2023-11-10 PROCEDURE — 80061 LIPID PANEL: CPT

## 2023-11-10 PROCEDURE — 36415 COLL VENOUS BLD VENIPUNCTURE: CPT

## 2023-11-30 ENCOUNTER — OFFICE VISIT (OUTPATIENT)
Dept: PRIMARY CARE | Facility: CLINIC | Age: 78
End: 2023-11-30
Payer: MEDICARE

## 2023-11-30 VITALS
DIASTOLIC BLOOD PRESSURE: 72 MMHG | BODY MASS INDEX: 23.86 KG/M2 | SYSTOLIC BLOOD PRESSURE: 134 MMHG | WEIGHT: 176.2 LBS | RESPIRATION RATE: 16 BRPM | HEART RATE: 78 BPM | OXYGEN SATURATION: 97 % | HEIGHT: 72 IN

## 2023-11-30 DIAGNOSIS — Z91.89 10 YEAR RISK OF MI OR STROKE 7.5% OR GREATER: ICD-10-CM

## 2023-11-30 DIAGNOSIS — I10 PRIMARY HYPERTENSION: Primary | ICD-10-CM

## 2023-11-30 PROCEDURE — 1159F MED LIST DOCD IN RCRD: CPT | Performed by: FAMILY MEDICINE

## 2023-11-30 PROCEDURE — 1160F RVW MEDS BY RX/DR IN RCRD: CPT | Performed by: FAMILY MEDICINE

## 2023-11-30 PROCEDURE — 99214 OFFICE O/P EST MOD 30 MIN: CPT | Performed by: FAMILY MEDICINE

## 2023-11-30 PROCEDURE — 3078F DIAST BP <80 MM HG: CPT | Performed by: FAMILY MEDICINE

## 2023-11-30 PROCEDURE — 1036F TOBACCO NON-USER: CPT | Performed by: FAMILY MEDICINE

## 2023-11-30 PROCEDURE — 3075F SYST BP GE 130 - 139MM HG: CPT | Performed by: FAMILY MEDICINE

## 2023-11-30 RX ORDER — ATORVASTATIN CALCIUM 20 MG/1
20 TABLET, FILM COATED ORAL DAILY
Qty: 90 TABLET | Refills: 1 | Status: SHIPPED | OUTPATIENT
Start: 2023-11-30

## 2023-11-30 NOTE — PROGRESS NOTES
Subjective   Patient ID: Daryl Gramajo is a 78 y.o. male who presents for Hypertension (Bp check follow up).    HPI   H/O HTN.  BP elevated at last visit (was out of med x 2 wks).  Here for BP check.  Taking med(s) as directed since last visit.  Does not need refills at this time.    Labs ordered at last visit.  Here to review results.    Review of Systems  No other complaints.     Objective   /72   Pulse 78   Resp 16   Ht 1.829 m (6')   Wt 79.9 kg (176 lb 3.2 oz)   SpO2 97%   BMI 23.90 kg/m²     Physical Exam  Constitutional:       General: He is not in acute distress.     Appearance: He is normal weight.   Cardiovascular:      Rate and Rhythm: Normal rate and regular rhythm.      Heart sounds: Normal heart sounds. No murmur heard.     No friction rub. No gallop.   Pulmonary:      Effort: Pulmonary effort is normal.      Breath sounds: Normal breath sounds. No wheezing, rhonchi or rales.   Neurological:      Mental Status: He is oriented to person, place, and time.   Psychiatric:         Mood and Affect: Mood normal.         Behavior: Behavior normal.     Assessment/Plan   Diagnoses and all orders for this visit:  Primary hypertension  10 year risk of MI or stroke 7.5% or greater  -     atorvastatin (Lipitor) 20 mg tablet; Take 1 tablet (20 mg) by mouth once daily.    Blood pressure normal.  Continue medication.    Reviewed lab results.  10 year risk of heart attack/stroke is 23.9%.  Start Atorvastatin.    F/U 6 months: Med refills, Repeat fasting lipids.

## 2023-11-30 NOTE — PATIENT INSTRUCTIONS
Blood pressure normal.  Continue medication.    Reviewed lab results.  10 year risk of heart attack/stroke is 23.9%.  Start Atorvastatin.    F/U 6 months: Med refills, Repeat fasting lipids.

## 2023-12-10 ENCOUNTER — HOSPITAL ENCOUNTER (OUTPATIENT)
Dept: RADIOLOGY | Facility: EXTERNAL LOCATION | Age: 78
Discharge: HOME | End: 2023-12-10
Payer: MEDICARE

## 2023-12-10 DIAGNOSIS — M25.552 LEFT HIP PAIN: ICD-10-CM

## 2023-12-12 DIAGNOSIS — M06.9 RHEUMATOID ARTHRITIS, INVOLVING UNSPECIFIED SITE, UNSPECIFIED WHETHER RHEUMATOID FACTOR PRESENT (MULTI): Primary | ICD-10-CM

## 2023-12-12 RX ORDER — METHOTREXATE 2.5 MG/1
15 TABLET ORAL
Qty: 66 TABLET | Refills: 1 | Status: SHIPPED | OUTPATIENT
Start: 2023-12-12 | End: 2023-12-19 | Stop reason: SDUPTHER

## 2023-12-19 DIAGNOSIS — M06.9 RHEUMATOID ARTHRITIS, INVOLVING UNSPECIFIED SITE, UNSPECIFIED WHETHER RHEUMATOID FACTOR PRESENT (MULTI): ICD-10-CM

## 2023-12-19 RX ORDER — METHOTREXATE 2.5 MG/1
15 TABLET ORAL
Qty: 72 TABLET | Refills: 1 | Status: SHIPPED | OUTPATIENT
Start: 2023-12-19 | End: 2023-12-29 | Stop reason: SDUPTHER

## 2023-12-28 ENCOUNTER — SPECIALTY PHARMACY (OUTPATIENT)
Dept: PHARMACY | Facility: CLINIC | Age: 78
End: 2023-12-28

## 2023-12-28 ENCOUNTER — OFFICE VISIT (OUTPATIENT)
Dept: RHEUMATOLOGY | Facility: CLINIC | Age: 78
End: 2023-12-28
Payer: MEDICARE

## 2023-12-28 ENCOUNTER — LAB (OUTPATIENT)
Dept: LAB | Facility: LAB | Age: 78
End: 2023-12-28
Payer: MEDICARE

## 2023-12-28 VITALS
HEART RATE: 82 BPM | SYSTOLIC BLOOD PRESSURE: 153 MMHG | DIASTOLIC BLOOD PRESSURE: 88 MMHG | HEIGHT: 73 IN | BODY MASS INDEX: 22.32 KG/M2 | WEIGHT: 168.4 LBS

## 2023-12-28 DIAGNOSIS — M05.9 SEROPOSITIVE RHEUMATOID ARTHRITIS (MULTI): ICD-10-CM

## 2023-12-28 DIAGNOSIS — R59.0 ENLARGED LYMPH NODES IN ARMPIT: ICD-10-CM

## 2023-12-28 DIAGNOSIS — R50.9 FEVER, UNSPECIFIED: ICD-10-CM

## 2023-12-28 DIAGNOSIS — M05.9 SEROPOSITIVE RHEUMATOID ARTHRITIS (MULTI): Primary | ICD-10-CM

## 2023-12-28 LAB
ALBUMIN SERPL BCP-MCNC: 3.8 G/DL (ref 3.4–5)
ALP SERPL-CCNC: 64 U/L (ref 33–136)
ALT SERPL W P-5'-P-CCNC: 10 U/L (ref 10–52)
ANION GAP SERPL CALC-SCNC: 8 MMOL/L (ref 10–20)
AST SERPL W P-5'-P-CCNC: 21 U/L (ref 9–39)
BILIRUB SERPL-MCNC: 0.5 MG/DL (ref 0–1.2)
BUN SERPL-MCNC: 16 MG/DL (ref 6–23)
CALCIUM SERPL-MCNC: 9.5 MG/DL (ref 8.6–10.3)
CHLORIDE SERPL-SCNC: 102 MMOL/L (ref 98–107)
CO2 SERPL-SCNC: 30 MMOL/L (ref 21–32)
CREAT SERPL-MCNC: 0.88 MG/DL (ref 0.5–1.3)
CRP SERPL-MCNC: 2.17 MG/DL
ERYTHROCYTE [DISTWIDTH] IN BLOOD BY AUTOMATED COUNT: 14.6 % (ref 11.5–14.5)
ERYTHROCYTE [SEDIMENTATION RATE] IN BLOOD BY WESTERGREN METHOD: 125 MM/H (ref 0–20)
GFR SERPL CREATININE-BSD FRML MDRD: 89 ML/MIN/1.73M*2
GLUCOSE SERPL-MCNC: 87 MG/DL (ref 74–99)
HBV CORE AB SER QL: NONREACTIVE
HBV SURFACE AG SERPL QL IA: NONREACTIVE
HCT VFR BLD AUTO: 38.3 % (ref 41–52)
HCV AB SER QL: NONREACTIVE
HGB BLD-MCNC: 12 G/DL (ref 13.5–17.5)
MCH RBC QN AUTO: 28.8 PG (ref 26–34)
MCHC RBC AUTO-ENTMCNC: 31.3 G/DL (ref 32–36)
MCV RBC AUTO: 92 FL (ref 80–100)
NRBC BLD-RTO: 0 /100 WBCS (ref 0–0)
PLATELET # BLD AUTO: 169 X10*3/UL (ref 150–450)
POTASSIUM SERPL-SCNC: 3.8 MMOL/L (ref 3.5–5.3)
PROT SERPL-MCNC: 8 G/DL (ref 6.4–8.2)
RBC # BLD AUTO: 4.17 X10*6/UL (ref 4.5–5.9)
SODIUM SERPL-SCNC: 136 MMOL/L (ref 136–145)
WBC # BLD AUTO: 4.6 X10*3/UL (ref 4.4–11.3)

## 2023-12-28 PROCEDURE — 86704 HEP B CORE ANTIBODY TOTAL: CPT

## 2023-12-28 PROCEDURE — 86140 C-REACTIVE PROTEIN: CPT

## 2023-12-28 PROCEDURE — 86803 HEPATITIS C AB TEST: CPT

## 2023-12-28 PROCEDURE — 3079F DIAST BP 80-89 MM HG: CPT | Performed by: INTERNAL MEDICINE

## 2023-12-28 PROCEDURE — 80053 COMPREHEN METABOLIC PANEL: CPT

## 2023-12-28 PROCEDURE — 1036F TOBACCO NON-USER: CPT | Performed by: INTERNAL MEDICINE

## 2023-12-28 PROCEDURE — 85027 COMPLETE CBC AUTOMATED: CPT

## 2023-12-28 PROCEDURE — 1159F MED LIST DOCD IN RCRD: CPT | Performed by: INTERNAL MEDICINE

## 2023-12-28 PROCEDURE — 36415 COLL VENOUS BLD VENIPUNCTURE: CPT

## 2023-12-28 PROCEDURE — 85652 RBC SED RATE AUTOMATED: CPT

## 2023-12-28 PROCEDURE — 87340 HEPATITIS B SURFACE AG IA: CPT

## 2023-12-28 PROCEDURE — 86481 TB AG RESPONSE T-CELL SUSP: CPT

## 2023-12-28 PROCEDURE — 3077F SYST BP >= 140 MM HG: CPT | Performed by: INTERNAL MEDICINE

## 2023-12-28 PROCEDURE — 99214 OFFICE O/P EST MOD 30 MIN: CPT | Performed by: INTERNAL MEDICINE

## 2023-12-28 RX ORDER — ETANERCEPT 50 MG/ML
50 SOLUTION SUBCUTANEOUS
Qty: 4 ML | Refills: 5 | Status: SHIPPED | OUTPATIENT
Start: 2023-12-28 | End: 2023-12-28

## 2023-12-28 ASSESSMENT — ENCOUNTER SYMPTOMS
DEPRESSION: 0
LOSS OF SENSATION IN FEET: 0
OCCASIONAL FEELINGS OF UNSTEADINESS: 0

## 2023-12-28 NOTE — PROGRESS NOTES
Subjective   Patient ID: Daryl Gramajo is a 78 y.o. male who presents for Follow-up (4 mo fuv).  HPI  Patient with history of seropositive positive RF/CCP rheumatoid arthritis.  Has history of DVT.    I last saw him in 2023.  Last year he was open to starting Enbrel but then decided not to fill the medication.    In the interim he had pacemaker placed for AV block.  His wife  about 2 months ago.  He has now moved back into the area.    He has noticed that his pain is 6/10.  He does have symptoms in his fingers which bother him the most.  The cold does not bother him.  She denies any infections or falls.      Review of Systems   HENT:          Hard of hearing   Cardiovascular:         H/o pacemaker with h/o av block    Musculoskeletal:         Per HPI       Objective   Physical Exam  GEN: NAD A&O x3 appropriate affect hard of hearing.  SKIN: no rashes  PULSES: +radials  MSK:  Fullness in the MCPs 2 3 and also in the PIPs some mild fullness in the wrists  the right ankle more swollen than the left decreased range of motion shoulders and edema in the lower right extremity    Assessment/Plan     rheumatoid arthritis +RF/CCP -  -currently on methotrexate and sulfasalazine  -Patient again states that he would be open to doing more medication.  We had gotten Enbrel approved last year but he had deferred the prescription.  He is open to doing an injection rather than an infusion.  He thinks that his daughter could do the injection on a weekly basis for Enbrel.  Reviewed his recent echocardiogram that showed normal EF.  He does have a pacemaker.  -Discussed side effects of medication with him and we will have him come back again in 3 to 4 months.  -Reviewed a PET scan from 2023.  That he had for cardiology and it did note some increased hypermetabolic activity within the bilateral axillary lymph nodes compatible with a inflammatory process of lymphoma could not be excluded.  -I would like to have this  further evaluated before we move forward with a biologic.  There can be increased risk of lymphoma in rheumatoid patients.           Vicky Sin MD 12/28/23 12:07 PM

## 2023-12-29 DIAGNOSIS — M06.9 RHEUMATOID ARTHRITIS, INVOLVING UNSPECIFIED SITE, UNSPECIFIED WHETHER RHEUMATOID FACTOR PRESENT (MULTI): ICD-10-CM

## 2023-12-30 LAB
NIL(NEG) CONTROL SPOT COUNT: NORMAL
PANEL A SPOT COUNT: 0
PANEL B SPOT COUNT: 0
POS CONTROL SPOT COUNT: NORMAL
T-SPOT. TB INTERPRETATION: NEGATIVE

## 2023-12-31 RX ORDER — METHOTREXATE 2.5 MG/1
15 TABLET ORAL
Qty: 72 TABLET | Refills: 1 | Status: SHIPPED | OUTPATIENT
Start: 2023-12-31 | End: 2024-06-16

## 2024-01-25 ENCOUNTER — HOSPITAL ENCOUNTER (OUTPATIENT)
Dept: RADIOLOGY | Facility: HOSPITAL | Age: 79
Discharge: HOME | End: 2024-01-25
Payer: MEDICARE

## 2024-01-25 DIAGNOSIS — R50.9 FEVER, UNSPECIFIED: ICD-10-CM

## 2024-01-25 DIAGNOSIS — R59.0 ENLARGED LYMPH NODES IN ARMPIT: ICD-10-CM

## 2024-01-25 PROCEDURE — 78816 PET IMAGE W/CT FULL BODY: CPT | Mod: PI

## 2024-01-25 PROCEDURE — A9552 F18 FDG: HCPCS | Performed by: INTERNAL MEDICINE

## 2024-01-25 PROCEDURE — 3430000001 HC RX 343 DIAGNOSTIC RADIOPHARMACEUTICALS: Performed by: INTERNAL MEDICINE

## 2024-01-25 RX ORDER — FLUDEOXYGLUCOSE F 18 200 MCI/ML
11.1 INJECTION, SOLUTION INTRAVENOUS
Status: COMPLETED | OUTPATIENT
Start: 2024-01-25 | End: 2024-01-25

## 2024-01-25 RX ADMIN — FLUDEOXYGLUCOSE F 18 11.1 MILLICURIE: 200 INJECTION, SOLUTION INTRAVENOUS at 14:15

## 2024-04-08 ENCOUNTER — HOSPITAL ENCOUNTER (OUTPATIENT)
Dept: CARDIOLOGY | Facility: CLINIC | Age: 79
Discharge: HOME | End: 2024-04-08
Payer: MEDICARE

## 2024-04-08 DIAGNOSIS — I44.2 CHB (COMPLETE HEART BLOCK) (MULTI): ICD-10-CM

## 2024-04-08 PROCEDURE — 93294 REM INTERROG EVL PM/LDLS PM: CPT | Performed by: INTERNAL MEDICINE

## 2024-04-08 PROCEDURE — 93296 REM INTERROG EVL PM/IDS: CPT

## 2024-04-11 ENCOUNTER — LAB (OUTPATIENT)
Dept: LAB | Facility: LAB | Age: 79
End: 2024-04-11
Payer: MEDICARE

## 2024-04-11 ENCOUNTER — OFFICE VISIT (OUTPATIENT)
Dept: RHEUMATOLOGY | Facility: CLINIC | Age: 79
End: 2024-04-11
Payer: MEDICARE

## 2024-04-11 VITALS
SYSTOLIC BLOOD PRESSURE: 151 MMHG | DIASTOLIC BLOOD PRESSURE: 88 MMHG | HEIGHT: 74 IN | BODY MASS INDEX: 23.31 KG/M2 | OXYGEN SATURATION: 96 % | HEART RATE: 88 BPM | WEIGHT: 181.6 LBS

## 2024-04-11 DIAGNOSIS — M05.9 SEROPOSITIVE RHEUMATOID ARTHRITIS (MULTI): ICD-10-CM

## 2024-04-11 DIAGNOSIS — Z79.899 ENCOUNTER FOR LONG-TERM (CURRENT) USE OF MEDICATIONS: ICD-10-CM

## 2024-04-11 DIAGNOSIS — R59.0 AXILLARY LYMPHADENOPATHY: Primary | ICD-10-CM

## 2024-04-11 PROBLEM — R93.89 ABNORMAL RADIOGRAPHIC EXAMINATION: Status: ACTIVE | Noted: 2024-04-11

## 2024-04-11 PROBLEM — I82.5Z1 CHRONIC DEEP VEIN THROMBOSIS (DVT) OF DISTAL VEIN OF RIGHT LOWER EXTREMITY (MULTI): Status: ACTIVE | Noted: 2024-04-11

## 2024-04-11 PROBLEM — R94.8 ABNORMAL POSITRON EMISSION TOMOGRAPHY (PET) SCAN: Status: ACTIVE | Noted: 2024-04-11

## 2024-04-11 PROBLEM — R50.9 FEVER: Status: ACTIVE | Noted: 2024-04-11

## 2024-04-11 PROBLEM — I44.1 SECOND DEGREE AV BLOCK, MOBITZ TYPE II: Status: ACTIVE | Noted: 2023-10-06

## 2024-04-11 PROBLEM — R00.1 SINUS BRADYCARDIA: Status: ACTIVE | Noted: 2023-03-20

## 2024-04-11 PROBLEM — M25.559 ARTHRALGIA OF HIP: Status: ACTIVE | Noted: 2023-12-10

## 2024-04-11 PROBLEM — I44.2 COMPLETE HEART BLOCK (MULTI): Status: ACTIVE | Noted: 2024-04-11

## 2024-04-11 PROBLEM — R53.83 FATIGUE: Status: ACTIVE | Noted: 2024-04-11

## 2024-04-11 LAB
ALBUMIN SERPL BCP-MCNC: 3.7 G/DL (ref 3.4–5)
ALP SERPL-CCNC: 56 U/L (ref 33–136)
ALT SERPL W P-5'-P-CCNC: 9 U/L (ref 10–52)
ANION GAP SERPL CALC-SCNC: 9 MMOL/L (ref 10–20)
AST SERPL W P-5'-P-CCNC: 19 U/L (ref 9–39)
BILIRUB SERPL-MCNC: 0.5 MG/DL (ref 0–1.2)
BUN SERPL-MCNC: 19 MG/DL (ref 6–23)
CALCIUM SERPL-MCNC: 9.2 MG/DL (ref 8.6–10.3)
CHLORIDE SERPL-SCNC: 103 MMOL/L (ref 98–107)
CO2 SERPL-SCNC: 31 MMOL/L (ref 21–32)
CREAT SERPL-MCNC: 0.89 MG/DL (ref 0.5–1.3)
CRP SERPL-MCNC: 1.22 MG/DL
EGFRCR SERPLBLD CKD-EPI 2021: 88 ML/MIN/1.73M*2
ERYTHROCYTE [DISTWIDTH] IN BLOOD BY AUTOMATED COUNT: 15.7 % (ref 11.5–14.5)
ERYTHROCYTE [SEDIMENTATION RATE] IN BLOOD BY WESTERGREN METHOD: 72 MM/H (ref 0–20)
GLUCOSE SERPL-MCNC: 73 MG/DL (ref 74–99)
HCT VFR BLD AUTO: 36.3 % (ref 41–52)
HGB BLD-MCNC: 11.4 G/DL (ref 13.5–17.5)
MCH RBC QN AUTO: 28.5 PG (ref 26–34)
MCHC RBC AUTO-ENTMCNC: 31.4 G/DL (ref 32–36)
MCV RBC AUTO: 91 FL (ref 80–100)
NRBC BLD-RTO: 0 /100 WBCS (ref 0–0)
PLATELET # BLD AUTO: 169 X10*3/UL (ref 150–450)
POTASSIUM SERPL-SCNC: 4.1 MMOL/L (ref 3.5–5.3)
PROT SERPL-MCNC: 8 G/DL (ref 6.4–8.2)
RBC # BLD AUTO: 4 X10*6/UL (ref 4.5–5.9)
SODIUM SERPL-SCNC: 139 MMOL/L (ref 136–145)
WBC # BLD AUTO: 4.5 X10*3/UL (ref 4.4–11.3)

## 2024-04-11 PROCEDURE — 80053 COMPREHEN METABOLIC PANEL: CPT

## 2024-04-11 PROCEDURE — 1036F TOBACCO NON-USER: CPT | Performed by: INTERNAL MEDICINE

## 2024-04-11 PROCEDURE — 99214 OFFICE O/P EST MOD 30 MIN: CPT | Performed by: INTERNAL MEDICINE

## 2024-04-11 PROCEDURE — 85027 COMPLETE CBC AUTOMATED: CPT

## 2024-04-11 PROCEDURE — 85652 RBC SED RATE AUTOMATED: CPT

## 2024-04-11 PROCEDURE — 86140 C-REACTIVE PROTEIN: CPT

## 2024-04-11 PROCEDURE — 3079F DIAST BP 80-89 MM HG: CPT | Performed by: INTERNAL MEDICINE

## 2024-04-11 PROCEDURE — 36415 COLL VENOUS BLD VENIPUNCTURE: CPT

## 2024-04-11 PROCEDURE — 3077F SYST BP >= 140 MM HG: CPT | Performed by: INTERNAL MEDICINE

## 2024-04-11 PROCEDURE — 1159F MED LIST DOCD IN RCRD: CPT | Performed by: INTERNAL MEDICINE

## 2024-04-11 RX ORDER — MAGNESIUM HYDROXIDE 400 MG/5ML
SUSPENSION, ORAL (FINAL DOSE FORM) ORAL
COMMUNITY
Start: 2023-06-30

## 2024-04-11 RX ORDER — AMLODIPINE BESYLATE 10 MG/1
1 TABLET ORAL DAILY
COMMUNITY
Start: 2023-04-24

## 2024-04-11 RX ORDER — ACYCLOVIR 400 MG/1
400 TABLET ORAL
COMMUNITY
Start: 2024-01-11

## 2024-04-11 NOTE — PROGRESS NOTES
Subjective   Patient ID: Daryl Gramajo is a 78 y.o. male who presents for 4 month FUV.  HPI  Patient with history of seropositive positive RF/CCP rheumatoid arthritis.  Has history of DVT.  In the past we had gotten Enbrel but then had deferred for it to be filled.    We last saw him in December 2023 and had him continue on methotrexate and sulfasalazine. Had discussed possibility of using Enbrel again but then noted in his chart that he has had enlarged lymph nodes.  We did get a new PET scan and it did show persistent uptake in the bilateral axillary lymph nodes left more than right along with right cervical lymph node.  Uncertain if this could be inflammatory process, sarcoid, lymphoma.    He has difficulty making a fist with his fingers.  He feels all his other joints are doing okay.  He just feels weak and sore in his hands.  Every now and then he may get little bit of aches in his shoulders.  Denies any infections or falls.    Review of Systems   HENT:          Hard of hearing   Cardiovascular:         H/o pacemaker with h/o av block    Musculoskeletal:         Per HPI       Objective   Physical Exam  GEN: NAD A&O x3 appropriate affect hard of hearing.  SKIN: no rashes  PULSES: +radials  MSK:  Fullness in the MCPs and PIPs difficulty making a fist swelling in the bilateral wrists large swelling in the left elbow decreased range of motion bilateral shoulders mild fullness in the knees and ankles    .r  Assessment/Plan     rheumatoid arthritis +RF/CCP -  -currently on methotrexate and sulfasalazine.  Will have him continue for now.  -Patient seen to have persistent mild uptake within the bilateral axillary lymph nodes left more than right along with the right cervical lymph node.  May be due to his rheumatoid arthritis but would like to be certain before starting any biologic medications.  Will send him for CT-guided biopsy.  If this is negative can continue with plan for biologic therapy.  He does have  increase in activity of his rheumatoid arthritis.  Patient understands         Vicky Sin MD 04/11/24 11:28 AM

## 2024-04-29 ENCOUNTER — HOSPITAL ENCOUNTER (OUTPATIENT)
Dept: RADIOLOGY | Facility: HOSPITAL | Age: 79
Discharge: HOME | End: 2024-04-29
Payer: MEDICARE

## 2024-04-29 DIAGNOSIS — R59.0 AXILLARY LYMPHADENOPATHY: ICD-10-CM

## 2024-04-29 PROCEDURE — 88365 INSITU HYBRIDIZATION (FISH): CPT | Performed by: PATHOLOGY

## 2024-04-29 PROCEDURE — 88185 FLOWCYTOMETRY/TC ADD-ON: CPT | Mod: TC,91,PORLAB | Performed by: INTERNAL MEDICINE

## 2024-04-29 PROCEDURE — 76942 ECHO GUIDE FOR BIOPSY: CPT | Performed by: RADIOLOGY

## 2024-04-29 PROCEDURE — 88189 FLOWCYTOMETRY/READ 16 & >: CPT | Performed by: PATHOLOGY

## 2024-04-29 PROCEDURE — 88341 IMHCHEM/IMCYTCHM EA ADD ANTB: CPT | Performed by: PATHOLOGY

## 2024-04-29 PROCEDURE — 88305 TISSUE EXAM BY PATHOLOGIST: CPT | Performed by: PATHOLOGY

## 2024-04-29 PROCEDURE — 88365 INSITU HYBRIDIZATION (FISH): CPT | Mod: TC,91,PORLAB | Performed by: INTERNAL MEDICINE

## 2024-04-29 PROCEDURE — 2720000007 HC OR 272 NO HCPCS

## 2024-04-29 PROCEDURE — 88342 IMHCHEM/IMCYTCHM 1ST ANTB: CPT | Performed by: PATHOLOGY

## 2024-04-29 PROCEDURE — 76942 ECHO GUIDE FOR BIOPSY: CPT

## 2024-04-29 PROCEDURE — 38505 NEEDLE BIOPSY LYMPH NODES: CPT | Performed by: RADIOLOGY

## 2024-04-29 NOTE — POST-PROCEDURE NOTE
Interventional Radiology Brief Postprocedure Note    Attending: Rey Montague MD      Assistant: none    Diagnosis: LAD    Description of procedure: LN biopsy, left axillary    Anesthesia:  Local    Complications: None    Estimated Blood Loss: minimal      specimens collected      See detailed result report with images in PACS.    The patient tolerated the procedure well without incident or complication.

## 2024-05-06 LAB
CELL COUNT (BLOOD): 0.32 X10*3/UL
CELL POPULATIONS: NORMAL
DIAGNOSIS: NORMAL
FLOW DIFFERENTIAL: NORMAL
FLOW TEST ORDERED: NORMAL
LAB AP ASR DISCLAIMER: NORMAL
LAB TEST METHOD: NORMAL
LABORATORY COMMENT REPORT: NORMAL
NUMBER OF CELLS COLLECTED: NORMAL PER TUBE
PATH REPORT.FINAL DX SPEC: NORMAL
PATH REPORT.GROSS SPEC: NORMAL
PATH REPORT.RELEVANT HX SPEC: NORMAL
PATH REPORT.TOTAL CANCER: NORMAL
PATH REPORT.TOTAL CANCER: NORMAL
SIGNATURE COMMENT: NORMAL
SPECIMEN VIABILITY: NORMAL

## 2024-05-13 ENCOUNTER — TELEPHONE (OUTPATIENT)
Dept: RHEUMATOLOGY | Facility: CLINIC | Age: 79
End: 2024-05-13
Payer: MEDICARE

## 2024-05-13 DIAGNOSIS — M05.9 SEROPOSITIVE RHEUMATOID ARTHRITIS (MULTI): Primary | ICD-10-CM

## 2024-05-13 RX ORDER — ABATACEPT 125 MG/ML
125 INJECTION, SOLUTION SUBCUTANEOUS ONCE
Qty: 4 ML | Refills: 0 | Status: SHIPPED | OUTPATIENT
Start: 2024-05-13 | End: 2024-06-12

## 2024-05-13 NOTE — TELEPHONE ENCOUNTER
----- Message from Bruna Rodriguez MA sent at 5/13/2024  3:00 PM EDT -----  Patient I s aware of results and provider  instructions. Patient would prefer the Orencia injection over the IV.  ----- Message -----  From: Vicky Sin MD  Sent: 5/7/2024   6:43 AM EDT  To:  [place] [id number] Clinical Support Staff    The biopsy of his lymph node did not show any lymphoma or cancer.    This is good but we need to keep an eye on what ever medications we may put on in the future.  For his rheumatoid I would like to try to start Orencia instead of Enbrel since he has a lot of lymph nodes and I do not want anything to progress to lymphoma.  We can try for injectable Orencia or we can put in for IV Orencia.

## 2024-05-30 ENCOUNTER — OFFICE VISIT (OUTPATIENT)
Dept: PRIMARY CARE | Facility: CLINIC | Age: 79
End: 2024-05-30
Payer: MEDICARE

## 2024-05-30 VITALS
HEART RATE: 87 BPM | OXYGEN SATURATION: 98 % | SYSTOLIC BLOOD PRESSURE: 169 MMHG | WEIGHT: 182 LBS | DIASTOLIC BLOOD PRESSURE: 77 MMHG | RESPIRATION RATE: 16 BRPM | BODY MASS INDEX: 24.12 KG/M2 | HEIGHT: 73 IN

## 2024-05-30 DIAGNOSIS — E78.5 HYPERLIPIDEMIA, UNSPECIFIED HYPERLIPIDEMIA TYPE: ICD-10-CM

## 2024-05-30 DIAGNOSIS — I10 BENIGN HYPERTENSION: Primary | ICD-10-CM

## 2024-05-30 PROBLEM — I82.409 DVT (DEEP VENOUS THROMBOSIS) (MULTI): Status: RESOLVED | Noted: 2023-03-16 | Resolved: 2024-05-30

## 2024-05-30 PROBLEM — I82.5Z1 CHRONIC DEEP VEIN THROMBOSIS (DVT) OF DISTAL VEIN OF RIGHT LOWER EXTREMITY (MULTI): Status: RESOLVED | Noted: 2024-04-11 | Resolved: 2024-05-30

## 2024-05-30 PROCEDURE — 1159F MED LIST DOCD IN RCRD: CPT | Performed by: FAMILY MEDICINE

## 2024-05-30 PROCEDURE — 99214 OFFICE O/P EST MOD 30 MIN: CPT | Performed by: FAMILY MEDICINE

## 2024-05-30 PROCEDURE — 1160F RVW MEDS BY RX/DR IN RCRD: CPT | Performed by: FAMILY MEDICINE

## 2024-05-30 PROCEDURE — 1036F TOBACCO NON-USER: CPT | Performed by: FAMILY MEDICINE

## 2024-05-30 PROCEDURE — 3078F DIAST BP <80 MM HG: CPT | Performed by: FAMILY MEDICINE

## 2024-05-30 PROCEDURE — 1123F ACP DISCUSS/DSCN MKR DOCD: CPT | Performed by: FAMILY MEDICINE

## 2024-05-30 PROCEDURE — 3077F SYST BP >= 140 MM HG: CPT | Performed by: FAMILY MEDICINE

## 2024-05-30 RX ORDER — CHLORTHALIDONE 25 MG/1
25 TABLET ORAL DAILY
Qty: 90 TABLET | Refills: 1 | Status: SHIPPED | OUTPATIENT
Start: 2024-05-30

## 2024-05-30 NOTE — PROGRESS NOTES
"Subjective   Patient ID: Daryl Gramajo is a 78 y.o. male who presents for Med Refill.    HPI  H/O HLD, HTN.  Out of both meds x 1 week.  Requests refills.    Review of Systems  No other complaints.     Objective   /77   Pulse 87   Resp 16   Ht 1.854 m (6' 1\")   Wt 82.6 kg (182 lb)   SpO2 98%   BMI 24.01 kg/m²     Physical Exam  Constitutional:       General: He is not in acute distress.     Appearance: He is normal weight.   Cardiovascular:      Rate and Rhythm: Normal rate and regular rhythm.      Heart sounds: Normal heart sounds. No murmur heard.     No friction rub. No gallop.   Pulmonary:      Effort: Pulmonary effort is normal.      Breath sounds: Normal breath sounds. No wheezing, rhonchi or rales.   Neurological:      Mental Status: He is oriented to person, place, and time.   Psychiatric:         Mood and Affect: Mood normal.         Behavior: Behavior normal.     Assessment/Plan   Diagnoses and all orders for this visit:  Benign hypertension  -     chlorthalidone (Hygroton) 25 mg tablet; Take 1 tablet (25 mg) by mouth once daily.  Hyperlipidemia, unspecified hyperlipidemia type  -     Lipid Panel; Future    Fasting labs.  Refilled Chlorthalidone.  Will refill Atorvastatin after reviewing lab results.    F/U 2-3 weeks: BP check.  "

## 2024-05-30 NOTE — PATIENT INSTRUCTIONS
Fasting labs.  Refilled Chlorthalidone.  Will refill Atorvastatin after reviewing lab results.    F/U 2-3 weeks: BP check.    Lab services: [address]  Hours: M-F 6:30a-6p, Sat 8a-12p  Phone: [phone], Option 1

## 2024-06-11 ENCOUNTER — SPECIALTY PHARMACY (OUTPATIENT)
Dept: PHARMACY | Facility: CLINIC | Age: 79
End: 2024-06-11

## 2024-06-11 PROCEDURE — RXMED WILLOW AMBULATORY MEDICATION CHARGE

## 2024-06-14 ENCOUNTER — PHARMACY VISIT (OUTPATIENT)
Dept: PHARMACY | Facility: CLINIC | Age: 79
End: 2024-06-14
Payer: COMMERCIAL

## 2024-06-19 ENCOUNTER — TELEPHONE (OUTPATIENT)
Dept: RHEUMATOLOGY | Facility: CLINIC | Age: 79
End: 2024-06-19
Payer: MEDICARE

## 2024-06-20 ENCOUNTER — APPOINTMENT (OUTPATIENT)
Dept: PRIMARY CARE | Facility: CLINIC | Age: 79
End: 2024-06-20
Payer: MEDICARE

## 2024-06-20 VITALS
HEIGHT: 73 IN | RESPIRATION RATE: 16 BRPM | BODY MASS INDEX: 23.72 KG/M2 | OXYGEN SATURATION: 96 % | WEIGHT: 179 LBS | SYSTOLIC BLOOD PRESSURE: 134 MMHG | DIASTOLIC BLOOD PRESSURE: 68 MMHG | HEART RATE: 88 BPM

## 2024-06-20 DIAGNOSIS — I10 BENIGN HYPERTENSION: Primary | ICD-10-CM

## 2024-06-20 PROBLEM — I35.1 MILD AORTIC REGURGITATION: Status: ACTIVE | Noted: 2024-06-20

## 2024-06-20 PROBLEM — I35.0 MILD AORTIC STENOSIS: Status: ACTIVE | Noted: 2024-06-20

## 2024-06-20 PROCEDURE — 1036F TOBACCO NON-USER: CPT | Performed by: FAMILY MEDICINE

## 2024-06-20 PROCEDURE — 99213 OFFICE O/P EST LOW 20 MIN: CPT | Performed by: FAMILY MEDICINE

## 2024-06-20 PROCEDURE — 3075F SYST BP GE 130 - 139MM HG: CPT | Performed by: FAMILY MEDICINE

## 2024-06-20 PROCEDURE — 1159F MED LIST DOCD IN RCRD: CPT | Performed by: FAMILY MEDICINE

## 2024-06-20 PROCEDURE — 1160F RVW MEDS BY RX/DR IN RCRD: CPT | Performed by: FAMILY MEDICINE

## 2024-06-20 PROCEDURE — 3078F DIAST BP <80 MM HG: CPT | Performed by: FAMILY MEDICINE

## 2024-06-20 PROCEDURE — 1123F ACP DISCUSS/DSCN MKR DOCD: CPT | Performed by: FAMILY MEDICINE

## 2024-06-20 NOTE — PROGRESS NOTES
"Subjective   Patient ID: Daryl Gramajo is a 78 y.o. male who presents for Blood Pressure Check.    HPI   Has HTN.  BP elevated at last visit (was out of meds).  Here for BP check.  Taking med(s) as directed.  Does not need refills at this time.    Review of Systems  No other complaints.     Objective   /68   Pulse 88   Resp 16   Ht 1.854 m (6' 1\")   Wt 81.2 kg (179 lb)   SpO2 96%   BMI 23.62 kg/m²     Physical Exam  Constitutional:       General: He is not in acute distress.     Appearance: He is normal weight.   Cardiovascular:      Rate and Rhythm: Normal rate and regular rhythm.      Heart sounds: Murmur (soft, 2/6, left) heard.   Pulmonary:      Effort: Pulmonary effort is normal.      Breath sounds: No wheezing, rhonchi or rales.   Neurological:      Mental Status: He is oriented to person, place, and time.   Psychiatric:         Mood and Affect: Mood normal.         Behavior: Behavior normal.     Assessment/Plan   Diagnoses and all orders for this visit:  Benign hypertension    Blood pressure normal.  Continue current medication.    F/U 5 months: Annual wellness visit.  "

## 2024-07-01 ENCOUNTER — TELEMEDICINE CLINICAL SUPPORT (OUTPATIENT)
Dept: RHEUMATOLOGY | Facility: CLINIC | Age: 79
End: 2024-07-01
Payer: MEDICARE

## 2024-07-01 NOTE — PROGRESS NOTES
[facility] Clinical Note    Daryl Gramajo is a 78 y.o. male, who is on the specialty pharmacy service for management of:  Rheumatology Core.    Daryl Gramajo is taking: Orencia 125 mg weekly.    Medication Receipt Date: 6/18/24  Medication Start Date (planned or actual): 6/21/24    Daryl was contacted on 7/1/2024 at 2:42 PM for a virtual pharmacy visit with encounter number [id number] from:   [facility] [address] [facility]  [address]  [facility] [address]  Dept: [phone]  Dept Fax: [phone]    Daryl was offered a Telemedicine Video visit or Telephone visit.  Daryl consented to a telephone visit, which was performed.    The most recent encounter visit with the referring prescriber Dr. Vicky Sin on 4/1/24 was reviewed.  Pharmacy will continue to collaborate in the care of this patient with the referring prescriber Dr. Vicky Sin .    General Assessment  Spoke with Daryl to complete Orencia FFA. Patient states his daughter that works in healthcare is off on Fridays and is helping him with injections every Friday. He says she has given him 2 injections so far and they have gone well with no side effects. He has no questions or concerns after our discussion.     Impression/Plan  IMPRESSION/PLAN:  Is patient high risk (potential patients:  pregnancy, geriatric, pediatric)?  No  Is laboratory follow-up needed? Has FUV with Dr. Sin next month, can address labs then   Is a clinical intervention needed? No  Next reassessment date? ~ 6 months   Additional comments:     Refer to the encounter summary report for documentation details about patient counseling and education.      Medication Adherence    The importance of adherence was discussed with the patient and they were advised to take the medication as prescribed by their provider. Jesus was encouraged to call her physician's office if they have a question regarding a missed  dose.     QOL/Patient Satisfaction  Rate your quality of life on scale of 1-10: -- (Declined to give number but is hopeful Orencia will help his RA symptoms)  Rate your satisfaction with  Specialty Pharmacy on scale of 1-10: 10 - Completely satisfied      Patient was advised to contact the pharmacy if there are any changes to their medication list, including prescriptions, OTC medications, herbal products, or supplements. Patient was advised of [facility]'s dispensing process, refill timeline, contact information ([phone]), and patient management follow up. Patient confirmed understanding of education conducted during assessment. All patient questions and concerns were addressed to the best of my ability. Patient was encouraged to contact the specialty pharmacy with any questions or concerns.    Confirmed follow-up outreaches are properly scheduled and reviewed goals of therapy with the patient.        Lisa Valentine, PharmD

## 2024-07-10 ENCOUNTER — SPECIALTY PHARMACY (OUTPATIENT)
Dept: PHARMACY | Facility: CLINIC | Age: 79
End: 2024-07-10

## 2024-07-10 DIAGNOSIS — M05.9 SEROPOSITIVE RHEUMATOID ARTHRITIS (MULTI): ICD-10-CM

## 2024-07-11 PROCEDURE — RXMED WILLOW AMBULATORY MEDICATION CHARGE

## 2024-07-11 RX ORDER — ABATACEPT 125 MG/ML
125 INJECTION, SOLUTION SUBCUTANEOUS
Qty: 4 ML | Refills: 2 | Status: SHIPPED | OUTPATIENT
Start: 2024-07-14

## 2024-07-15 DIAGNOSIS — M06.9 RHEUMATOID ARTHRITIS, INVOLVING UNSPECIFIED SITE, UNSPECIFIED WHETHER RHEUMATOID FACTOR PRESENT (MULTI): ICD-10-CM

## 2024-07-16 ENCOUNTER — PHARMACY VISIT (OUTPATIENT)
Dept: PHARMACY | Facility: CLINIC | Age: 79
End: 2024-07-16
Payer: COMMERCIAL

## 2024-07-16 RX ORDER — METHOTREXATE 2.5 MG/1
15 TABLET ORAL
Qty: 72 TABLET | Refills: 1 | Status: SHIPPED | OUTPATIENT
Start: 2024-07-21 | End: 2025-01-17

## 2024-08-12 ENCOUNTER — SPECIALTY PHARMACY (OUTPATIENT)
Dept: PHARMACY | Facility: CLINIC | Age: 79
End: 2024-08-12

## 2024-08-12 PROCEDURE — RXMED WILLOW AMBULATORY MEDICATION CHARGE

## 2024-08-13 ENCOUNTER — PHARMACY VISIT (OUTPATIENT)
Dept: PHARMACY | Facility: CLINIC | Age: 79
End: 2024-08-13
Payer: COMMERCIAL

## 2024-08-14 ENCOUNTER — OFFICE VISIT (OUTPATIENT)
Dept: RHEUMATOLOGY | Facility: CLINIC | Age: 79
End: 2024-08-14
Payer: MEDICARE

## 2024-08-14 VITALS
DIASTOLIC BLOOD PRESSURE: 87 MMHG | HEART RATE: 69 BPM | HEIGHT: 73 IN | OXYGEN SATURATION: 100 % | SYSTOLIC BLOOD PRESSURE: 171 MMHG | WEIGHT: 181.4 LBS | BODY MASS INDEX: 24.04 KG/M2

## 2024-08-14 DIAGNOSIS — Z79.899 ENCOUNTER FOR LONG-TERM (CURRENT) USE OF MEDICATIONS: ICD-10-CM

## 2024-08-14 DIAGNOSIS — M05.9 SEROPOSITIVE RHEUMATOID ARTHRITIS (MULTI): Primary | ICD-10-CM

## 2024-08-14 DIAGNOSIS — R59.0 AXILLARY LYMPHADENOPATHY: ICD-10-CM

## 2024-08-14 PROCEDURE — 99214 OFFICE O/P EST MOD 30 MIN: CPT | Performed by: INTERNAL MEDICINE

## 2024-08-14 PROCEDURE — 1123F ACP DISCUSS/DSCN MKR DOCD: CPT | Performed by: INTERNAL MEDICINE

## 2024-08-14 PROCEDURE — 3077F SYST BP >= 140 MM HG: CPT | Performed by: INTERNAL MEDICINE

## 2024-08-14 PROCEDURE — 3079F DIAST BP 80-89 MM HG: CPT | Performed by: INTERNAL MEDICINE

## 2024-08-14 NOTE — PROGRESS NOTES
Subjective   Patient ID: Daryl Gramajo is a 79 y.o. male who presents for Follow-up (4 mo fuv).  HPI  Patient with history of seropositive positive RF/CCP rheumatoid arthritis.  Has history of DVT.  In the past we had gotten Enbrel but then had deferred for it to be filled.    At his last visit we had done guided biopsy of lymph node and did not show any lymphoma.  We had started him on Orencia and he feels like he has been doing about 2 months worth of it.  Hard to gauge his pain with it.  He says that he is doing okay.  He does not feel that his joints are as warm.  No injection site reactions.  He says he has been taking his medication regularly.  Denies any infections    Review of Systems   HENT:          Hard of hearing   Cardiovascular:         H/o pacemaker with h/o av block    Musculoskeletal:         Per HPI       Objective   Physical Exam  GEN: NAD A&O x3 appropriate affect hard of hearing.  SKIN: no rashes  PULSES: +radials  MSK:  Has fullness in the MCPs and PIPs but no tenderness no swelling currently in the wrists has fullness and tenderness in the left elbow no swelling in the knees has mild fullness in the ankles more on the right than the left.r  Assessment/Plan     rheumatoid arthritis +RF/CCP -  -currently on Orencia, methotrexate, sulfasalazine.  Has some slight decrease in inflammation on exam today.  Not as warm and swollen Umang was previously   -Will have him continue on his current regimen  -Lymphadenopathy status postbiopsy in May 2024 that was negative for lymphoma.  May have to do with his rheumatoid arthritis    Will have him come back again in 4 to 5 months or as needed       Vicky Sin MD 08/14/24 12:20 PM

## 2024-08-15 ENCOUNTER — LAB (OUTPATIENT)
Dept: LAB | Facility: LAB | Age: 79
End: 2024-08-15
Payer: MEDICARE

## 2024-08-15 DIAGNOSIS — Z79.899 ENCOUNTER FOR LONG-TERM (CURRENT) USE OF MEDICATIONS: ICD-10-CM

## 2024-08-15 DIAGNOSIS — E78.5 HYPERLIPIDEMIA, UNSPECIFIED HYPERLIPIDEMIA TYPE: ICD-10-CM

## 2024-08-15 DIAGNOSIS — R59.0 AXILLARY LYMPHADENOPATHY: ICD-10-CM

## 2024-08-15 DIAGNOSIS — M05.9 SEROPOSITIVE RHEUMATOID ARTHRITIS (MULTI): ICD-10-CM

## 2024-08-15 LAB
ALBUMIN SERPL BCP-MCNC: 3.9 G/DL (ref 3.4–5)
ALP SERPL-CCNC: 63 U/L (ref 33–136)
ALT SERPL W P-5'-P-CCNC: 10 U/L (ref 10–52)
ANION GAP SERPL CALC-SCNC: 8 MMOL/L (ref 10–20)
AST SERPL W P-5'-P-CCNC: 22 U/L (ref 9–39)
BILIRUB SERPL-MCNC: 0.5 MG/DL (ref 0–1.2)
BUN SERPL-MCNC: 18 MG/DL (ref 6–23)
CALCIUM SERPL-MCNC: 9.2 MG/DL (ref 8.6–10.3)
CHLORIDE SERPL-SCNC: 103 MMOL/L (ref 98–107)
CHOLEST SERPL-MCNC: 173 MG/DL (ref 0–199)
CHOLESTEROL/HDL RATIO: 2.9
CO2 SERPL-SCNC: 31 MMOL/L (ref 21–32)
CREAT SERPL-MCNC: 0.96 MG/DL (ref 0.5–1.3)
CRP SERPL-MCNC: 0.33 MG/DL
EGFRCR SERPLBLD CKD-EPI 2021: 81 ML/MIN/1.73M*2
ERYTHROCYTE [DISTWIDTH] IN BLOOD BY AUTOMATED COUNT: 16.3 % (ref 11.5–14.5)
ERYTHROCYTE [SEDIMENTATION RATE] IN BLOOD BY WESTERGREN METHOD: 37 MM/H (ref 0–20)
GLUCOSE SERPL-MCNC: 72 MG/DL (ref 74–99)
HCT VFR BLD AUTO: 40.1 % (ref 41–52)
HDLC SERPL-MCNC: 59.2 MG/DL
HGB BLD-MCNC: 12.8 G/DL (ref 13.5–17.5)
LDLC SERPL CALC-MCNC: 103 MG/DL
MCH RBC QN AUTO: 29 PG (ref 26–34)
MCHC RBC AUTO-ENTMCNC: 31.9 G/DL (ref 32–36)
MCV RBC AUTO: 91 FL (ref 80–100)
NON HDL CHOLESTEROL: 114 MG/DL (ref 0–149)
NRBC BLD-RTO: 0 /100 WBCS (ref 0–0)
PLATELET # BLD AUTO: 188 X10*3/UL (ref 150–450)
POTASSIUM SERPL-SCNC: 3.7 MMOL/L (ref 3.5–5.3)
PROT SERPL-MCNC: 7.7 G/DL (ref 6.4–8.2)
RBC # BLD AUTO: 4.42 X10*6/UL (ref 4.5–5.9)
SODIUM SERPL-SCNC: 138 MMOL/L (ref 136–145)
TRIGL SERPL-MCNC: 56 MG/DL (ref 0–149)
VLDL: 11 MG/DL (ref 0–40)
WBC # BLD AUTO: 3.4 X10*3/UL (ref 4.4–11.3)

## 2024-08-15 PROCEDURE — 80053 COMPREHEN METABOLIC PANEL: CPT

## 2024-08-15 PROCEDURE — 80061 LIPID PANEL: CPT

## 2024-08-15 PROCEDURE — 85652 RBC SED RATE AUTOMATED: CPT

## 2024-08-15 PROCEDURE — 36415 COLL VENOUS BLD VENIPUNCTURE: CPT

## 2024-08-15 PROCEDURE — 85027 COMPLETE CBC AUTOMATED: CPT

## 2024-08-15 PROCEDURE — 86140 C-REACTIVE PROTEIN: CPT

## 2024-08-18 DIAGNOSIS — Z91.89 10 YEAR RISK OF MI OR STROKE 7.5% OR GREATER: ICD-10-CM

## 2024-08-18 RX ORDER — ATORVASTATIN CALCIUM 40 MG/1
40 TABLET, FILM COATED ORAL DAILY
Qty: 90 TABLET | Refills: 0 | Status: SHIPPED | OUTPATIENT
Start: 2024-08-18

## 2024-09-10 ENCOUNTER — SPECIALTY PHARMACY (OUTPATIENT)
Dept: PHARMACY | Facility: CLINIC | Age: 79
End: 2024-09-10

## 2024-09-10 PROCEDURE — RXMED WILLOW AMBULATORY MEDICATION CHARGE

## 2024-09-12 ENCOUNTER — PHARMACY VISIT (OUTPATIENT)
Dept: PHARMACY | Facility: CLINIC | Age: 79
End: 2024-09-12
Payer: COMMERCIAL

## 2024-09-18 ENCOUNTER — APPOINTMENT (OUTPATIENT)
Dept: PRIMARY CARE | Facility: CLINIC | Age: 79
End: 2024-09-18
Payer: MEDICARE

## 2024-09-18 VITALS
OXYGEN SATURATION: 98 % | RESPIRATION RATE: 16 BRPM | HEIGHT: 73 IN | WEIGHT: 185 LBS | DIASTOLIC BLOOD PRESSURE: 82 MMHG | HEART RATE: 86 BPM | BODY MASS INDEX: 24.52 KG/M2 | SYSTOLIC BLOOD PRESSURE: 140 MMHG

## 2024-09-18 ASSESSMENT — ENCOUNTER SYMPTOMS
DEPRESSION: 0
OCCASIONAL FEELINGS OF UNSTEADINESS: 0
LOSS OF SENSATION IN FEET: 0

## 2024-10-08 ENCOUNTER — HOSPITAL ENCOUNTER (OUTPATIENT)
Dept: CARDIOLOGY | Facility: CLINIC | Age: 79
Discharge: HOME | End: 2024-10-08
Payer: MEDICARE

## 2024-10-08 ENCOUNTER — SPECIALTY PHARMACY (OUTPATIENT)
Dept: PHARMACY | Facility: CLINIC | Age: 79
End: 2024-10-08

## 2024-10-08 DIAGNOSIS — I44.2 CHB (COMPLETE HEART BLOCK): ICD-10-CM

## 2024-10-08 DIAGNOSIS — M06.9 RHEUMATOID ARTHRITIS, INVOLVING UNSPECIFIED SITE, UNSPECIFIED WHETHER RHEUMATOID FACTOR PRESENT (MULTI): ICD-10-CM

## 2024-10-08 DIAGNOSIS — Z95.0 PRESENCE OF CARDIAC PACEMAKER: ICD-10-CM

## 2024-10-08 DIAGNOSIS — M05.9 SEROPOSITIVE RHEUMATOID ARTHRITIS: ICD-10-CM

## 2024-10-08 DIAGNOSIS — M05.9 SEROPOSITIVE RHEUMATOID ARTHRITIS: Primary | ICD-10-CM

## 2024-10-08 PROCEDURE — 93296 REM INTERROG EVL PM/IDS: CPT

## 2024-10-09 RX ORDER — FOLIC ACID 1 MG/1
1 TABLET ORAL DAILY
Qty: 90 TABLET | Refills: 3 | Status: SHIPPED | OUTPATIENT
Start: 2024-10-09 | End: 2025-10-09

## 2024-10-09 RX ORDER — ABATACEPT 125 MG/ML
125 INJECTION, SOLUTION SUBCUTANEOUS
Qty: 4 ML | Refills: 2 | Status: SHIPPED | OUTPATIENT
Start: 2024-10-13

## 2024-10-09 RX ORDER — METHOTREXATE 2.5 MG/1
15 TABLET ORAL
Qty: 72 TABLET | Refills: 1 | Status: SHIPPED | OUTPATIENT
Start: 2024-10-13 | End: 2025-04-11

## 2024-10-10 ENCOUNTER — PHARMACY VISIT (OUTPATIENT)
Dept: PHARMACY | Facility: CLINIC | Age: 79
End: 2024-10-10
Payer: COMMERCIAL

## 2024-10-10 DIAGNOSIS — M05.9 SEROPOSITIVE RHEUMATOID ARTHRITIS: Primary | ICD-10-CM

## 2024-10-10 PROCEDURE — RXMED WILLOW AMBULATORY MEDICATION CHARGE

## 2024-10-11 RX ORDER — SULFASALAZINE 500 MG/1
500 TABLET ORAL 2 TIMES DAILY
Qty: 180 TABLET | Refills: 3 | Status: SHIPPED | OUTPATIENT
Start: 2024-10-11 | End: 2025-10-11

## 2024-11-05 ENCOUNTER — SPECIALTY PHARMACY (OUTPATIENT)
Dept: PHARMACY | Facility: CLINIC | Age: 79
End: 2024-11-05

## 2024-11-05 PROCEDURE — RXMED WILLOW AMBULATORY MEDICATION CHARGE

## 2024-11-07 ENCOUNTER — PHARMACY VISIT (OUTPATIENT)
Dept: PHARMACY | Facility: CLINIC | Age: 79
End: 2024-11-07
Payer: COMMERCIAL

## 2024-11-14 ENCOUNTER — LAB (OUTPATIENT)
Dept: LAB | Facility: LAB | Age: 79
End: 2024-11-14
Payer: MEDICARE

## 2024-11-14 ENCOUNTER — OFFICE VISIT (OUTPATIENT)
Dept: RHEUMATOLOGY | Facility: CLINIC | Age: 79
End: 2024-11-14
Payer: MEDICARE

## 2024-11-14 VITALS
HEIGHT: 73 IN | HEART RATE: 67 BPM | WEIGHT: 188.6 LBS | BODY MASS INDEX: 25 KG/M2 | OXYGEN SATURATION: 100 % | SYSTOLIC BLOOD PRESSURE: 192 MMHG | DIASTOLIC BLOOD PRESSURE: 94 MMHG

## 2024-11-14 DIAGNOSIS — M05.9 SEROPOSITIVE RHEUMATOID ARTHRITIS: ICD-10-CM

## 2024-11-14 DIAGNOSIS — Z79.899 ENCOUNTER FOR LONG-TERM (CURRENT) USE OF MEDICATIONS: ICD-10-CM

## 2024-11-14 DIAGNOSIS — M05.9 SEROPOSITIVE RHEUMATOID ARTHRITIS: Primary | ICD-10-CM

## 2024-11-14 LAB
ALBUMIN SERPL BCP-MCNC: 3.9 G/DL (ref 3.4–5)
ALP SERPL-CCNC: 63 U/L (ref 33–136)
ALT SERPL W P-5'-P-CCNC: 10 U/L (ref 10–52)
ANION GAP SERPL CALC-SCNC: 10 MMOL/L (ref 10–20)
AST SERPL W P-5'-P-CCNC: 22 U/L (ref 9–39)
BILIRUB SERPL-MCNC: 0.6 MG/DL (ref 0–1.2)
BUN SERPL-MCNC: 19 MG/DL (ref 6–23)
CALCIUM SERPL-MCNC: 8.9 MG/DL (ref 8.6–10.3)
CHLORIDE SERPL-SCNC: 104 MMOL/L (ref 98–107)
CO2 SERPL-SCNC: 30 MMOL/L (ref 21–32)
CREAT SERPL-MCNC: 0.99 MG/DL (ref 0.5–1.3)
CRP SERPL-MCNC: 0.34 MG/DL
EGFRCR SERPLBLD CKD-EPI 2021: 78 ML/MIN/1.73M*2
ERYTHROCYTE [DISTWIDTH] IN BLOOD BY AUTOMATED COUNT: 15.3 % (ref 11.5–14.5)
ERYTHROCYTE [SEDIMENTATION RATE] IN BLOOD BY WESTERGREN METHOD: 24 MM/H (ref 0–20)
GLUCOSE SERPL-MCNC: 131 MG/DL (ref 74–99)
HCT VFR BLD AUTO: 39.1 % (ref 41–52)
HGB BLD-MCNC: 12.3 G/DL (ref 13.5–17.5)
MCH RBC QN AUTO: 29 PG (ref 26–34)
MCHC RBC AUTO-ENTMCNC: 31.5 G/DL (ref 32–36)
MCV RBC AUTO: 92 FL (ref 80–100)
NRBC BLD-RTO: 0 /100 WBCS (ref 0–0)
PLATELET # BLD AUTO: 150 X10*3/UL (ref 150–450)
POTASSIUM SERPL-SCNC: 3.8 MMOL/L (ref 3.5–5.3)
PROT SERPL-MCNC: 7.1 G/DL (ref 6.4–8.2)
RBC # BLD AUTO: 4.24 X10*6/UL (ref 4.5–5.9)
SODIUM SERPL-SCNC: 140 MMOL/L (ref 136–145)
WBC # BLD AUTO: 3.5 X10*3/UL (ref 4.4–11.3)

## 2024-11-14 PROCEDURE — 3080F DIAST BP >= 90 MM HG: CPT | Performed by: INTERNAL MEDICINE

## 2024-11-14 PROCEDURE — 99417 PROLNG OP E/M EACH 15 MIN: CPT | Performed by: INTERNAL MEDICINE

## 2024-11-14 PROCEDURE — 3077F SYST BP >= 140 MM HG: CPT | Performed by: INTERNAL MEDICINE

## 2024-11-14 PROCEDURE — 99213 OFFICE O/P EST LOW 20 MIN: CPT | Performed by: INTERNAL MEDICINE

## 2024-11-14 PROCEDURE — 85027 COMPLETE CBC AUTOMATED: CPT

## 2024-11-14 PROCEDURE — 80053 COMPREHEN METABOLIC PANEL: CPT

## 2024-11-14 PROCEDURE — 86140 C-REACTIVE PROTEIN: CPT

## 2024-11-14 PROCEDURE — 1159F MED LIST DOCD IN RCRD: CPT | Performed by: INTERNAL MEDICINE

## 2024-11-14 PROCEDURE — 36415 COLL VENOUS BLD VENIPUNCTURE: CPT

## 2024-11-14 PROCEDURE — 85652 RBC SED RATE AUTOMATED: CPT

## 2024-11-14 PROCEDURE — 1123F ACP DISCUSS/DSCN MKR DOCD: CPT | Performed by: INTERNAL MEDICINE

## 2024-12-04 ENCOUNTER — SPECIALTY PHARMACY (OUTPATIENT)
Dept: PHARMACY | Facility: CLINIC | Age: 79
End: 2024-12-04

## 2024-12-04 PROCEDURE — RXMED WILLOW AMBULATORY MEDICATION CHARGE

## 2024-12-05 ENCOUNTER — PHARMACY VISIT (OUTPATIENT)
Dept: PHARMACY | Facility: CLINIC | Age: 79
End: 2024-12-05
Payer: COMMERCIAL

## 2024-12-18 ENCOUNTER — SPECIALTY PHARMACY (OUTPATIENT)
Dept: PHARMACY | Facility: CLINIC | Age: 79
End: 2024-12-18

## 2024-12-18 DIAGNOSIS — M05.9 SEROPOSITIVE RHEUMATOID ARTHRITIS: ICD-10-CM

## 2024-12-18 RX ORDER — ABATACEPT 125 MG/ML
125 INJECTION, SOLUTION SUBCUTANEOUS
Qty: 4 ML | Refills: 5 | Status: SHIPPED | OUTPATIENT
Start: 2024-12-22

## 2024-12-25 PROCEDURE — RXMED WILLOW AMBULATORY MEDICATION CHARGE

## 2024-12-30 DIAGNOSIS — Z95.0 PACEMAKER: Primary | ICD-10-CM

## 2024-12-30 DIAGNOSIS — R00.1 BRADYCARDIA ON ECG: ICD-10-CM

## 2024-12-31 ENCOUNTER — SPECIALTY PHARMACY (OUTPATIENT)
Dept: PHARMACY | Facility: CLINIC | Age: 79
End: 2024-12-31

## 2025-01-02 ENCOUNTER — PHARMACY VISIT (OUTPATIENT)
Dept: PHARMACY | Facility: CLINIC | Age: 80
End: 2025-01-02
Payer: COMMERCIAL

## 2025-01-03 ENCOUNTER — HOSPITAL ENCOUNTER (OUTPATIENT)
Dept: CARDIOLOGY | Facility: CLINIC | Age: 80
Discharge: HOME | End: 2025-01-03
Payer: MEDICARE

## 2025-01-03 DIAGNOSIS — Z95.0 PRESENCE OF CARDIAC PACEMAKER: ICD-10-CM

## 2025-01-03 DIAGNOSIS — I44.2 CHB (COMPLETE HEART BLOCK): ICD-10-CM

## 2025-01-03 PROCEDURE — 93280 PM DEVICE PROGR EVAL DUAL: CPT | Performed by: NURSE PRACTITIONER

## 2025-01-03 PROCEDURE — 93280 PM DEVICE PROGR EVAL DUAL: CPT

## 2025-01-10 ENCOUNTER — SPECIALTY PHARMACY (OUTPATIENT)
Dept: PHARMACY | Facility: CLINIC | Age: 80
End: 2025-01-10

## 2025-01-10 NOTE — PROGRESS NOTES
[facility] Clinical Note  Patient Reassessment     Introduction  Daryl Gramajo is a 79 y.o. male who is on the specialty pharmacy service for management of: Rheumatology Core.      [facility] supplied medication: orencia    Duration of therapy: Maintenance    The most recent encounter visit with the referring prescriber Dr Sin on 11/14/24 was reviewed.  Pharmacy will continue to collaborate in the care of this patient with the referring prescriber.    Discussion  Daryl was contacted on 1/10/2025 at 4:04 PM for a pharmacy visit with encounter number [id number] from:   [facility]  [address] [place] [id number]  Dept: [phone]  Dept Fax: [phone]  Daryl consented to a/an Telephone visit, which was performed.    Efficacy  Patient has developed new symptoms of condition: No  Patient/caregiver feels medication is affecting the disease state: yes    Goals  Provided education on goals and possible outcomes of therapy:  Adherence with therapy  Timely completion of appropriate labs  Timely and appropriate follow up with provider  Identify and address medication interactions with presciption medications, OTC medications and supplements  Optimize or maintain quality of life  Rheumatology: Remission or low disease activity   Patient status for the above goal(s): progressing    Tolerance  Patient has experienced side effects from this medication: No  Changes to current therapy regimen: No    The follow-up timeline was discussed. Every person responds to and reacts to therapy differently. Patient should be assessed for efficacy and tolerability in approximately: 6 months    Adherence      The importance of adherence was discussed and patient/caregiver was advised to take the medication as prescribed by their provider. Encouraged patient/caregiver to call physician's office or specialty pharmacy if they have a question regarding a missed  dose.    General Assessment  Changes to home medications, OTCs or supplements: No  Current Outpatient Medications   Medication Sig Dispense Refill    abatacept (Orencia ClickJect) 125 mg/mL auto-injector auto-injection Inject 1 mL (125 mg) under the skin 1 (one) time per week. 4 mL 5    acyclovir (Zovirax) 400 mg tablet Take 1 tablet (400 mg) by mouth.      Antiseptic Skin Clnsr,chlorhe, 4 % external liquid APPLY TOPICALLY TO AFFECTED AREA ONCE A DAY BEGINNING 5 DAYS BEFORE SURGERY      ascorbic acid, vitamin C, 500 mg capsule Take 1 capsule by mouth once daily.      atorvastatin (Lipitor) 40 mg tablet Take 1 tablet (40 mg) by mouth once daily. 90 tablet 0    brinzolamide-brimonidine (Simbrinza) 1-0.2 % drops,suspension Administer into affected eye(s).      chlorthalidone (Hygroton) 25 mg tablet Take 1 tablet (25 mg) by mouth once daily. 90 tablet 1    cholecalciferol (Vitamin D3) 5,000 Units tablet Take 1 tablet (5,000 Units) by mouth once daily.      erythromycin (Romycin) 5 mg/gram (0.5 %) ophthalmic ointment Apply to affected eye(s).      fluorometholone (FML) 0.1 % ophthalmic suspension Administer into affected eye(s).      folic acid (Folvite) 1 mg tablet Take 1 tablet (1 mg) by mouth once daily. 90 tablet 3    methotrexate (Trexall) 2.5 mg tablet Take 6 tablets (15 mg total) by mouth 1 (one) time per week. 72 tablet 1    sulfaSALAzine (Azulfidine) 500 mg tablet Take 1 tablet (500 mg) by mouth 2 times a day. 180 tablet 3     No current facility-administered medications for this visit.     Reported new allergies: No  Reported new medical conditions: No  Additional monitoring reviewed: no  Is laboratory follow up needed? No    Advised to contact the pharmacy if there are any changes to the patient's medication list, including prescriptions, OTC medications, herbal products, or supplements.    Impression/Plan  This patient has not been identified as high risk due to Lack of high risk qualifiers.  The following  action was taken: N/A.         Provided contact information ([phone]) for [facility] and reviewed dispensing process, refill timeline and patient management follow up. Confirmed understanding of education conducted during assessment. All questions and concerns were addressed and patient/caregiver was encouraged to reach out for additional questions or concerns.    Based on the patient's diagnosis, medication list, progress towards goals, adherence, tolerance, and medication list, medication remains appropriate: I attest    Hilario Cazares, PharmD

## 2025-01-30 ENCOUNTER — SPECIALTY PHARMACY (OUTPATIENT)
Dept: PHARMACY | Facility: CLINIC | Age: 80
End: 2025-01-30

## 2025-01-30 PROCEDURE — RXMED WILLOW AMBULATORY MEDICATION CHARGE

## 2025-02-03 ENCOUNTER — APPOINTMENT (OUTPATIENT)
Dept: PRIMARY CARE | Facility: CLINIC | Age: 80
End: 2025-02-03
Payer: MEDICARE

## 2025-02-03 ENCOUNTER — PHARMACY VISIT (OUTPATIENT)
Dept: PHARMACY | Facility: CLINIC | Age: 80
End: 2025-02-03
Payer: COMMERCIAL

## 2025-02-14 ENCOUNTER — OFFICE VISIT (OUTPATIENT)
Dept: PRIMARY CARE | Facility: CLINIC | Age: 80
End: 2025-02-14
Payer: MEDICARE

## 2025-02-14 VITALS
BODY MASS INDEX: 26.6 KG/M2 | OXYGEN SATURATION: 95 % | DIASTOLIC BLOOD PRESSURE: 68 MMHG | WEIGHT: 190 LBS | SYSTOLIC BLOOD PRESSURE: 138 MMHG | HEIGHT: 71 IN | HEART RATE: 86 BPM | TEMPERATURE: 98 F

## 2025-02-14 DIAGNOSIS — Z12.5 PROSTATE CANCER SCREENING: ICD-10-CM

## 2025-02-14 DIAGNOSIS — Z91.89 10 YEAR RISK OF MI OR STROKE 7.5% OR GREATER: ICD-10-CM

## 2025-02-14 DIAGNOSIS — Z00.00 MEDICARE ANNUAL WELLNESS VISIT, SUBSEQUENT: Primary | ICD-10-CM

## 2025-02-14 DIAGNOSIS — Z13.89 SCREENING FOR HEMATURIA OR PROTEINURIA: ICD-10-CM

## 2025-02-14 DIAGNOSIS — I10 BENIGN HYPERTENSION: ICD-10-CM

## 2025-02-14 PROBLEM — R93.89 ABNORMAL RADIOGRAPHIC EXAMINATION: Status: RESOLVED | Noted: 2024-04-11 | Resolved: 2025-02-14

## 2025-02-14 PROBLEM — R60.0 EDEMA, PERIPHERAL: Status: RESOLVED | Noted: 2023-03-16 | Resolved: 2025-02-14

## 2025-02-14 PROBLEM — R00.1 BRADYCARDIA: Status: RESOLVED | Noted: 2023-03-20 | Resolved: 2025-02-14

## 2025-02-14 PROBLEM — R00.1 SINUS BRADYCARDIA: Status: RESOLVED | Noted: 2023-03-20 | Resolved: 2025-02-14

## 2025-02-14 PROBLEM — R53.83 FATIGUE: Status: RESOLVED | Noted: 2024-04-11 | Resolved: 2025-02-14

## 2025-02-14 PROBLEM — R50.9 FEVER: Status: RESOLVED | Noted: 2024-04-11 | Resolved: 2025-02-14

## 2025-02-14 RX ORDER — CHLORTHALIDONE 25 MG/1
25 TABLET ORAL DAILY
Qty: 100 TABLET | Refills: 1 | Status: SHIPPED | OUTPATIENT
Start: 2025-02-14

## 2025-02-14 ASSESSMENT — ACTIVITIES OF DAILY LIVING (ADL)
TAKING_MEDICATION: INDEPENDENT
GROCERY_SHOPPING: INDEPENDENT
DRESSING: INDEPENDENT
BATHING: INDEPENDENT
MANAGING_FINANCES: INDEPENDENT
DOING_HOUSEWORK: INDEPENDENT

## 2025-02-14 ASSESSMENT — PATIENT HEALTH QUESTIONNAIRE - PHQ9
SUM OF ALL RESPONSES TO PHQ9 QUESTIONS 1 AND 2: 0
2. FEELING DOWN, DEPRESSED OR HOPELESS: NOT AT ALL
1. LITTLE INTEREST OR PLEASURE IN DOING THINGS: NOT AT ALL

## 2025-02-14 ASSESSMENT — ENCOUNTER SYMPTOMS
LOSS OF SENSATION IN FEET: 0
DEPRESSION: 0
OCCASIONAL FEELINGS OF UNSTEADINESS: 1

## 2025-02-14 ASSESSMENT — ANXIETY QUESTIONNAIRES
IF YOU CHECKED OFF ANY PROBLEMS ON THIS QUESTIONNAIRE, HOW DIFFICULT HAVE THESE PROBLEMS MADE IT FOR YOU TO DO YOUR WORK, TAKE CARE OF THINGS AT HOME, OR GET ALONG WITH OTHER PEOPLE: NOT DIFFICULT AT ALL
7. FEELING AFRAID AS IF SOMETHING AWFUL MIGHT HAPPEN: NOT AT ALL
2. NOT BEING ABLE TO STOP OR CONTROL WORRYING: NOT AT ALL
4. TROUBLE RELAXING: NOT AT ALL
1. FEELING NERVOUS, ANXIOUS, OR ON EDGE: NOT AT ALL
GAD7 TOTAL SCORE: 0
6. BECOMING EASILY ANNOYED OR IRRITABLE: NOT AT ALL
3. WORRYING TOO MUCH ABOUT DIFFERENT THINGS: NOT AT ALL
5. BEING SO RESTLESS THAT IT IS HARD TO SIT STILL: NOT AT ALL

## 2025-02-14 ASSESSMENT — PAIN SCALES - GENERAL: PAINLEVEL_OUTOF10: 0-NO PAIN

## 2025-02-14 NOTE — PROGRESS NOTES
Subjective   Reason for Visit: Daryl Gramajo is an 79 y.o. male here for a Medicare Wellness visit.     Past Medical, Surgical, and Family History reviewed and updated in chart.    Reviewed all medications by prescribing practitioner or clinical pharmacist (such as prescriptions, OTCs, herbal therapies and supplements) and documented in the medical record.    HPI    Patient Self Assessment of Health Status  Patient Self Assessment: Good    Nutrition and Exercise  Current Diet: Well Balanced Diet  Adequate Fluid Intake: Yes  Caffeine: No  Exercise Frequency: Infrequently    Functional Ability/Level of Safety  Cognitive Impairment Observed: No cognitive impairment observed  Cognitive Impairment Reported: No cognitive impairment reported by patient or family    Home Safety Risk Factors: None    Has HLD, HTN.  Should have been out of Chlorthalidone and Atorvastatin x 3 months based on refill pattern.  Requests refills.    Medicare Wellness Billing Compliance Satisfied    *This is a visual tool to show completion of required items on the day of the visit. Green checks will only appear on the date of visit.    Review all medications by prescribing practitioner or clinical pharmacist (such as prescriptions, OTCs, herbal therapies and supplements) documented in the medical record    Past Medical, Surgical, and Family History reviewed and updated in chart    Tobacco Use Reviewed    Alcohol Use Reviewed    Illicit Drug Use Reviewed    PHQ2/9    Falls in Last Year Reviewed    Home Safety Risk Factors Reviewed    Cognitive Impairment Reviewed    Patient Self Assessment and Health Status    Current Diet Reviewed    Exercise Frequency    ADL - Hearing Impairment    ADL - Bathing    ADL - Dressing    ADL - Walks in Home    IADL - Managing Finances    IADL - Grocery Shopping    IADL - Taking Medications    IADL - Doing Housework      Patient Care Team:  Miguel Sidhu MD as PCP - General (Family Medicine)  "    Review of Systems  No other complaints.     Objective   Vitals:  /68 (BP Location: Left arm, Patient Position: Sitting, BP Cuff Size: Adult)   Pulse 86   Temp 36.7 °C (98 °F) (Temporal)   Ht 1.803 m (5' 11\")   Wt 86.2 kg (190 lb)   SpO2 95%   BMI 26.50 kg/m²       Physical Exam  Constitutional:       General: He is not in acute distress.     Appearance: He is overweight.   Musculoskeletal:      Comments: Ambulating w/o assistance   Neurological:      Mental Status: He is oriented to person, place, and time.   Psychiatric:         Mood and Affect: Mood normal.         Behavior: Behavior normal.     Assessment/Plan   Diagnoses and all orders for this visit:  Medicare annual wellness visit, subsequent  Benign hypertension  -     Basic Metabolic Panel; Future  -     Lipid Panel; Future  -     Alanine Aminotransferase; Future  -     Aspartate Aminotransferase; Future  -     TSH with reflex to Free T4 if abnormal; Future  -     chlorthalidone (Hygroton) 25 mg tablet; Take 1 tablet (25 mg) by mouth once daily.  10 year risk of MI or stroke 7.5% or greater  -     Lipid Panel; Future  -     Alanine Aminotransferase; Future  -     Aspartate Aminotransferase; Future  Prostate cancer screening  -     Prostate Specific Antigen, Screen; Future  Screening for hematuria or proteinuria  -     Urinalysis with Reflex Culture and Microscopic; Future    Risk factors identified during visit:   BMI<18.5 or >25: Recommend weight loss efforts (see www.yourweightmatters.org/category/nutrition for ideas), BMI<18.5 or >25: Patient declines nutrition referrals    Flu shot: Patient declined.  PPSV23: Patient declined.  PCV13: Patient declined.  PCV20: Patient declined.  RSV: Patient declined.  Shingrix: Patient declined.  Colon cancer screening: Patient declined.  AAA screening: N/A.  HIV screening: N/A.  Hepatitis C screening: Up to date.  Prostate cancer screening: Ordered.    Recommend living will, health care power of " .    Fasting labs ordered.  Refilled blood pressure medication.  Will refill cholesterol medication after reviewing lab results.  Follow up with specialists as directed.    F/U 6 months: Med refills.

## 2025-02-14 NOTE — PATIENT INSTRUCTIONS
Risk factors identified during visit:   BMI<18.5 or >25: Recommend weight loss efforts (see www.yourweightmatters.org/category/nutrition for ideas), BMI<18.5 or >25: Patient declines nutrition referrals    Flu shot: Patient declined.  PPSV23: Patient declined.  PCV13: Patient declined.  PCV20: Patient declined.  RSV: Patient declined.  Shingrix: Patient declined.  Colon cancer screening: Patient declined.  AAA screening: N/A.  HIV screening: N/A.  Hepatitis C screening: Up to date.  Prostate cancer screening: Ordered.    Recommend living will, health care power of .    Fasting labs ordered.  Refilled blood pressure medication.  Will refill cholesterol medication after reviewing lab results.  Follow up with specialists as directed.    F/U 6 months: Med refills.    Lab services: [address]  Hours: M-F 7:15a-6:00p  Phone: [phone], Option 1

## 2025-02-25 ENCOUNTER — SPECIALTY PHARMACY (OUTPATIENT)
Dept: PHARMACY | Facility: CLINIC | Age: 80
End: 2025-02-25

## 2025-02-25 PROCEDURE — RXMED WILLOW AMBULATORY MEDICATION CHARGE

## 2025-02-27 ENCOUNTER — PHARMACY VISIT (OUTPATIENT)
Dept: PHARMACY | Facility: CLINIC | Age: 80
End: 2025-02-27
Payer: COMMERCIAL

## 2025-03-14 ENCOUNTER — APPOINTMENT (OUTPATIENT)
Dept: RHEUMATOLOGY | Facility: CLINIC | Age: 80
End: 2025-03-14
Payer: MEDICARE

## 2025-03-14 VITALS
HEART RATE: 70 BPM | HEIGHT: 71 IN | DIASTOLIC BLOOD PRESSURE: 70 MMHG | SYSTOLIC BLOOD PRESSURE: 161 MMHG | OXYGEN SATURATION: 91 % | BODY MASS INDEX: 25.76 KG/M2 | WEIGHT: 184 LBS

## 2025-03-14 DIAGNOSIS — Z79.899 ENCOUNTER FOR LONG-TERM (CURRENT) USE OF MEDICATIONS: ICD-10-CM

## 2025-03-14 DIAGNOSIS — M05.9 SEROPOSITIVE RHEUMATOID ARTHRITIS: Primary | ICD-10-CM

## 2025-03-14 PROCEDURE — 99213 OFFICE O/P EST LOW 20 MIN: CPT | Performed by: INTERNAL MEDICINE

## 2025-03-14 PROCEDURE — G2211 COMPLEX E/M VISIT ADD ON: HCPCS | Performed by: INTERNAL MEDICINE

## 2025-03-14 PROCEDURE — 3077F SYST BP >= 140 MM HG: CPT | Performed by: INTERNAL MEDICINE

## 2025-03-14 PROCEDURE — 1123F ACP DISCUSS/DSCN MKR DOCD: CPT | Performed by: INTERNAL MEDICINE

## 2025-03-14 PROCEDURE — 3078F DIAST BP <80 MM HG: CPT | Performed by: INTERNAL MEDICINE

## 2025-03-14 PROCEDURE — 1036F TOBACCO NON-USER: CPT | Performed by: INTERNAL MEDICINE

## 2025-03-14 RX ORDER — LOSARTAN POTASSIUM AND HYDROCHLOROTHIAZIDE 12.5; 1 MG/1; MG/1
1 TABLET ORAL
COMMUNITY
Start: 2024-12-27

## 2025-03-14 RX ORDER — AMLODIPINE BESYLATE 5 MG/1
1 TABLET ORAL
COMMUNITY
Start: 2025-03-07

## 2025-03-14 ASSESSMENT — PATIENT HEALTH QUESTIONNAIRE - PHQ9
1. LITTLE INTEREST OR PLEASURE IN DOING THINGS: NOT AT ALL
SUM OF ALL RESPONSES TO PHQ9 QUESTIONS 1 AND 2: 0
2. FEELING DOWN, DEPRESSED OR HOPELESS: NOT AT ALL

## 2025-03-14 ASSESSMENT — COLUMBIA-SUICIDE SEVERITY RATING SCALE - C-SSRS
2. HAVE YOU ACTUALLY HAD ANY THOUGHTS OF KILLING YOURSELF?: NO
6. HAVE YOU EVER DONE ANYTHING, STARTED TO DO ANYTHING, OR PREPARED TO DO ANYTHING TO END YOUR LIFE?: NO
1. IN THE PAST MONTH, HAVE YOU WISHED YOU WERE DEAD OR WISHED YOU COULD GO TO SLEEP AND NOT WAKE UP?: NO

## 2025-03-14 NOTE — PROGRESS NOTES
Subjective   Patient ID: Daryl Gramajo is a 79 y.o. male who presents for Follow-up (4 Month FUV ).  HPI  Patient with history of seropositive positive RF/CCP rheumatoid arthritis.  Has history of DVT.  In the past we had gotten Enbrel but then had deferred for it to be filled.    Patient was last seen in November and at that time we had him continue with Orencia, methotrexate, sulfasalazine.    He was in the emergency room on 2/26/2025 weakness and fall.  He had slipped off the side of the bed.  He says there was a rug on the floor and made him slipped.  He was unable to get off the floor.  Did not lose consciousness.  Had tested positive flu A  Has been following in wound clinic for left medial ankle wound.  Has finally healed.  Also has chronic eczema.  He feels that he has been doing okay with his joints.  He does wear compression socks at times.    Review of Systems   HENT:          Hard of hearing   Cardiovascular:         H/o pacemaker with h/o av block    Musculoskeletal:         Per HPI       Objective   Physical Exam  GEN: NAD A&O x3 appropriate affect hard of hearing.  SKIN: no rashes  PULSES: +radials  MSK:  Has decrease in fullness in MCPs and PIPs and no current warmth or swelling in the wrists  Significant decreased range of motion of left shoulder compared to right  Decrease swelling in the knees though right more than left  Some mild fullness in the right ankle more than left    Assessment/Plan     rheumatoid arthritis +RF/CCP -  -currently on Orencia, methotrexate, sulfasalazine.    -I do feel that he has had positive improvement with Orencia and we will have him continue.  -Reviewed his blood work from his emergency room visit.  Just had mild decrease in potassium which they did supplement him for.  -He had recently seen his PCP In February and has blood work ordered.  -Will consider in the future for him to decrease medicine such as methotrexate or sulfasalazine    -Lymphadenopathy status  postbiopsy in May 2024 that was negative for lymphoma.  May have to do with his rheumatoid arthritis    Will have him come back again in 4 to 5 months or as needed       Vicky Sin MD 03/14/25 10:31 AM

## 2025-03-26 ENCOUNTER — SPECIALTY PHARMACY (OUTPATIENT)
Dept: PHARMACY | Facility: CLINIC | Age: 80
End: 2025-03-26

## 2025-03-26 PROCEDURE — RXMED WILLOW AMBULATORY MEDICATION CHARGE

## 2025-03-28 ENCOUNTER — PHARMACY VISIT (OUTPATIENT)
Dept: PHARMACY | Facility: CLINIC | Age: 80
End: 2025-03-28
Payer: COMMERCIAL

## 2025-04-02 DIAGNOSIS — M06.9 RHEUMATOID ARTHRITIS, INVOLVING UNSPECIFIED SITE, UNSPECIFIED WHETHER RHEUMATOID FACTOR PRESENT (MULTI): ICD-10-CM

## 2025-04-03 RX ORDER — METHOTREXATE 2.5 MG/1
15 TABLET ORAL
Qty: 72 TABLET | Refills: 1 | Status: SHIPPED | OUTPATIENT
Start: 2025-04-06 | End: 2025-09-21

## 2025-04-04 ENCOUNTER — OFFICE VISIT (OUTPATIENT)
Dept: URBAN - METROPOLITAN AREA CLINIC 42 | Facility: CLINIC | Age: 80
End: 2025-04-04
Payer: COMMERCIAL

## 2025-04-04 ENCOUNTER — TELEPHONE ENCOUNTER (OUTPATIENT)
Dept: URBAN - METROPOLITAN AREA CLINIC 92 | Facility: CLINIC | Age: 80
End: 2025-04-04

## 2025-04-04 DIAGNOSIS — K74.69 OTHER CIRRHOSIS OF LIVER: ICD-10-CM

## 2025-04-04 DIAGNOSIS — R13.10 DYSPHAGIA, UNSPECIFIED TYPE: ICD-10-CM

## 2025-04-04 DIAGNOSIS — Z86.0100 HISTORY OF COLON POLYPS: ICD-10-CM

## 2025-04-04 PROBLEM — 40739000: Status: ACTIVE | Noted: 2025-04-04

## 2025-04-04 PROCEDURE — 99214 OFFICE O/P EST MOD 30 MIN: CPT | Performed by: INTERNAL MEDICINE

## 2025-04-04 RX ORDER — OMEPRAZOLE 40 MG/1
1 CAPSULE 30 MINUTES BEFORE MORNING MEAL CAPSULE, DELAYED RELEASE ORAL ONCE A DAY
Qty: 30 | Refills: 5
Start: 2025-04-04

## 2025-04-04 RX ORDER — LEVOTHYROXINE SODIUM 0.05 MG/1
TAKE 1 TABLET (50 MCG) BY ORAL ROUTE ONCE DAILY TABLET ORAL 1
Qty: 0 | Refills: 0 | Status: ACTIVE | COMMUNITY
Start: 1900-01-01

## 2025-04-04 RX ORDER — APIXABAN 2.5 MG/1
TAKE 1 TABLET (2.5 MG) BY ORAL ROUTE 2 TIMES PER DAY TABLET, FILM COATED ORAL 2
Qty: 0 | Refills: 0 | Status: ACTIVE | COMMUNITY
Start: 1900-01-01

## 2025-04-04 RX ORDER — CALCITRIOL 0.25 UG/1
TAKE 1 CAPSULE (0.25 MCG) BY ORAL ROUTE ONCE DAILY CAPSULE ORAL 1
Qty: 0 | Refills: 0 | Status: ACTIVE | COMMUNITY
Start: 1900-01-01

## 2025-04-04 RX ORDER — HYDRALAZINE HYDROCHLORIDE 50 MG/1
TAKE 1 TABLET (50 MG) BY ORAL ROUTE 2 TIMES PER DAY WITH FOOD TABLET ORAL 2
Qty: 0 | Refills: 0 | Status: ACTIVE | COMMUNITY
Start: 1900-01-01

## 2025-04-04 RX ORDER — CARVEDILOL 25 MG/1
TAKE 1 TABLET (25 MG) BY ORAL ROUTE 2 TIMES PER DAY WITH FOOD TABLET, FILM COATED ORAL 2
Qty: 0 | Refills: 0 | Status: ACTIVE | COMMUNITY
Start: 1900-01-01

## 2025-04-04 RX ORDER — OMEPRAZOLE 40 MG/1
1 CAPSULE 30 MINUTES BEFORE MORNING MEAL CAPSULE, DELAYED RELEASE ORAL ONCE A DAY
Qty: 30 | Refills: 5 | OUTPATIENT
Start: 2025-04-04

## 2025-04-04 RX ORDER — TORSEMIDE 20 MG/1
TAKE 1 TABLET (20 MG) BY ORAL ROUTE ONCE DAILY TABLET ORAL 1
Qty: 0 | Refills: 0 | Status: ACTIVE | COMMUNITY
Start: 1900-01-01

## 2025-04-04 NOTE — HPI-TODAY'S VISIT:
The patient is following up today for cirrhosis.  Since the last visit, doing well.  No real complaints today.  Seeing cardiology and being managed with diuretics.  Still drink 1 drink every other week.  No GI bleeding.  4/4/25 The patient cancelled his EGD/colonoscopy and has been lost to follow-up for the past 2 years.  Now presenting again complaining of dysphagia to solids and liquids.  Denies heartburn or NV.

## 2025-04-08 ENCOUNTER — HOSPITAL ENCOUNTER (OUTPATIENT)
Dept: CARDIOLOGY | Facility: CLINIC | Age: 80
Discharge: HOME | End: 2025-04-08
Payer: MEDICARE

## 2025-04-08 DIAGNOSIS — I44.2 CHB (COMPLETE HEART BLOCK): ICD-10-CM

## 2025-04-08 DIAGNOSIS — Z95.0 PRESENCE OF CARDIAC PACEMAKER: ICD-10-CM

## 2025-04-08 PROCEDURE — 93296 REM INTERROG EVL PM/IDS: CPT

## 2025-04-28 ENCOUNTER — SPECIALTY PHARMACY (OUTPATIENT)
Dept: PHARMACY | Facility: CLINIC | Age: 80
End: 2025-04-28

## 2025-04-28 PROCEDURE — RXMED WILLOW AMBULATORY MEDICATION CHARGE

## 2025-04-29 ENCOUNTER — PHARMACY VISIT (OUTPATIENT)
Dept: PHARMACY | Facility: CLINIC | Age: 80
End: 2025-04-29
Payer: COMMERCIAL

## 2025-05-30 ENCOUNTER — SPECIALTY PHARMACY (OUTPATIENT)
Dept: PHARMACY | Facility: CLINIC | Age: 80
End: 2025-05-30

## 2025-05-30 PROCEDURE — RXMED WILLOW AMBULATORY MEDICATION CHARGE

## 2025-05-31 DIAGNOSIS — M06.9 RHEUMATOID ARTHRITIS, INVOLVING UNSPECIFIED SITE, UNSPECIFIED WHETHER RHEUMATOID FACTOR PRESENT (MULTI): ICD-10-CM

## 2025-05-31 DIAGNOSIS — M05.9 SEROPOSITIVE RHEUMATOID ARTHRITIS: ICD-10-CM

## 2025-06-02 ENCOUNTER — PHARMACY VISIT (OUTPATIENT)
Dept: PHARMACY | Facility: CLINIC | Age: 80
End: 2025-06-02
Payer: COMMERCIAL

## 2025-06-02 RX ORDER — SULFASALAZINE 500 MG/1
500 TABLET ORAL 2 TIMES DAILY
Qty: 200 TABLET | Refills: 2 | Status: SHIPPED | OUTPATIENT
Start: 2025-06-02

## 2025-06-02 RX ORDER — FOLIC ACID 1 MG/1
1 TABLET ORAL DAILY
Qty: 100 TABLET | Refills: 2 | Status: SHIPPED | OUTPATIENT
Start: 2025-06-02

## 2025-06-06 DIAGNOSIS — M05.9 SEROPOSITIVE RHEUMATOID ARTHRITIS: ICD-10-CM

## 2025-06-06 RX ORDER — ABATACEPT 125 MG/ML
125 INJECTION, SOLUTION SUBCUTANEOUS
Qty: 4 ML | Refills: 5 | Status: SHIPPED | OUTPATIENT
Start: 2025-06-08 | End: 2025-06-06 | Stop reason: SDUPTHER

## 2025-06-06 RX ORDER — ABATACEPT 125 MG/ML
125 INJECTION, SOLUTION SUBCUTANEOUS
Qty: 4 ML | Refills: 5 | Status: SHIPPED | OUTPATIENT
Start: 2025-06-08

## 2025-06-06 NOTE — PROGRESS NOTES
Re-sending updated Orencia rx to [facility] per pharmacy request. Telepharmacy pt. Continuation of therapy

## 2025-06-09 ENCOUNTER — SPECIALTY PHARMACY (OUTPATIENT)
Dept: PHARMACY | Facility: CLINIC | Age: 80
End: 2025-06-09

## 2025-06-25 ENCOUNTER — SPECIALTY PHARMACY (OUTPATIENT)
Dept: PHARMACY | Facility: CLINIC | Age: 80
End: 2025-06-25

## 2025-06-25 PROCEDURE — RXMED WILLOW AMBULATORY MEDICATION CHARGE

## 2025-06-30 ENCOUNTER — PHARMACY VISIT (OUTPATIENT)
Dept: PHARMACY | Facility: CLINIC | Age: 80
End: 2025-06-30
Payer: COMMERCIAL

## 2025-07-14 ENCOUNTER — HOSPITAL ENCOUNTER (OUTPATIENT)
Dept: CARDIOLOGY | Facility: CLINIC | Age: 80
Discharge: HOME | End: 2025-07-14
Payer: MEDICARE

## 2025-07-14 DIAGNOSIS — I44.2 CHB (COMPLETE HEART BLOCK): ICD-10-CM

## 2025-07-14 DIAGNOSIS — Z95.0 PRESENCE OF CARDIAC PACEMAKER: ICD-10-CM

## 2025-07-14 PROCEDURE — 93296 REM INTERROG EVL PM/IDS: CPT

## 2025-07-18 ENCOUNTER — SPECIALTY PHARMACY (OUTPATIENT)
Dept: PHARMACY | Facility: CLINIC | Age: 80
End: 2025-07-18

## 2025-07-18 PROCEDURE — RXMED WILLOW AMBULATORY MEDICATION CHARGE

## 2025-07-21 ENCOUNTER — PHARMACY VISIT (OUTPATIENT)
Dept: PHARMACY | Facility: CLINIC | Age: 80
End: 2025-07-21
Payer: COMMERCIAL

## 2025-07-23 ENCOUNTER — PATIENT OUTREACH (OUTPATIENT)
Dept: PRIMARY CARE | Facility: CLINIC | Age: 80
End: 2025-07-23
Payer: MEDICARE

## 2025-07-24 ENCOUNTER — APPOINTMENT (OUTPATIENT)
Dept: RHEUMATOLOGY | Facility: CLINIC | Age: 80
End: 2025-07-24
Payer: MEDICARE

## 2025-08-13 ENCOUNTER — SPECIALTY PHARMACY (OUTPATIENT)
Dept: PHARMACY | Facility: CLINIC | Age: 80
End: 2025-08-13

## 2025-08-13 PROCEDURE — RXMED WILLOW AMBULATORY MEDICATION CHARGE

## 2025-08-18 ENCOUNTER — PHARMACY VISIT (OUTPATIENT)
Dept: PHARMACY | Facility: CLINIC | Age: 80
End: 2025-08-18
Payer: COMMERCIAL